# Patient Record
Sex: FEMALE | Race: WHITE | Employment: FULL TIME | URBAN - METROPOLITAN AREA
[De-identification: names, ages, dates, MRNs, and addresses within clinical notes are randomized per-mention and may not be internally consistent; named-entity substitution may affect disease eponyms.]

---

## 2020-03-12 PROBLEM — E66.01 SEVERE OBESITY (HCC): Status: ACTIVE | Noted: 2020-03-12

## 2020-03-13 ENCOUNTER — ANESTHESIA (OUTPATIENT)
Dept: SURGERY | Age: 70
End: 2020-03-13
Payer: COMMERCIAL

## 2020-03-13 ENCOUNTER — ANESTHESIA EVENT (OUTPATIENT)
Dept: SURGERY | Age: 70
End: 2020-03-13
Payer: COMMERCIAL

## 2020-03-13 ENCOUNTER — HOSPITAL ENCOUNTER (OUTPATIENT)
Age: 70
Setting detail: OUTPATIENT SURGERY
Discharge: HOME | End: 2020-03-13
Attending: UROLOGY | Admitting: UROLOGY
Payer: COMMERCIAL

## 2020-03-13 VITALS
BODY MASS INDEX: 38.05 KG/M2 | DIASTOLIC BLOOD PRESSURE: 82 MMHG | HEART RATE: 76 BPM | SYSTOLIC BLOOD PRESSURE: 150 MMHG | OXYGEN SATURATION: 94 % | RESPIRATION RATE: 16 BRPM | HEIGHT: 65 IN | TEMPERATURE: 98 F | WEIGHT: 228.38 LBS

## 2020-03-13 DIAGNOSIS — Z98.890 STATUS POST SURGERY: Primary | ICD-10-CM

## 2020-03-13 LAB
ANION GAP SERPL CALC-SCNC: 7 MMOL/L (ref 7–16)
BUN SERPL-MCNC: 12 MG/DL (ref 8–23)
CALCIUM SERPL-MCNC: 9 MG/DL (ref 8.3–10.4)
CHLORIDE SERPL-SCNC: 108 MMOL/L (ref 98–107)
CO2 SERPL-SCNC: 26 MMOL/L (ref 21–32)
CREAT SERPL-MCNC: 0.88 MG/DL (ref 0.6–1)
ERYTHROCYTE [DISTWIDTH] IN BLOOD BY AUTOMATED COUNT: 13.7 % (ref 11.9–14.6)
GLUCOSE SERPL-MCNC: 101 MG/DL (ref 65–100)
HCT VFR BLD AUTO: 39.5 % (ref 35.8–46.3)
HGB BLD-MCNC: 12.3 G/DL (ref 11.7–15.4)
MCH RBC QN AUTO: 27.9 PG (ref 26.1–32.9)
MCHC RBC AUTO-ENTMCNC: 31.1 G/DL (ref 31.4–35)
MCV RBC AUTO: 89.6 FL (ref 79.6–97.8)
NRBC # BLD: 0 K/UL (ref 0–0.2)
PLATELET # BLD AUTO: 253 K/UL (ref 150–450)
PMV BLD AUTO: 10.6 FL (ref 9.4–12.3)
POTASSIUM BLD-SCNC: 3.7 MMOL/L (ref 3.5–5.1)
POTASSIUM SERPL-SCNC: 3.6 MMOL/L (ref 3.5–5.1)
RBC # BLD AUTO: 4.41 M/UL (ref 4.05–5.2)
SODIUM SERPL-SCNC: 141 MMOL/L (ref 136–145)
WBC # BLD AUTO: 8.5 K/UL (ref 4.3–11.1)

## 2020-03-13 PROCEDURE — 74011000250 HC RX REV CODE- 250: Performed by: UROLOGY

## 2020-03-13 PROCEDURE — 77030040361 HC SLV COMPR DVT MDII -B: Performed by: UROLOGY

## 2020-03-13 PROCEDURE — 77030019927 HC TBNG IRR CYSTO BAXT -A: Performed by: UROLOGY

## 2020-03-13 PROCEDURE — 76210000063 HC OR PH I REC FIRST 0.5 HR: Performed by: UROLOGY

## 2020-03-13 PROCEDURE — 77030037088 HC TUBE ENDOTRACH ORAL NSL COVD-A: Performed by: ANESTHESIOLOGY

## 2020-03-13 PROCEDURE — 74011250636 HC RX REV CODE- 250/636: Performed by: UROLOGY

## 2020-03-13 PROCEDURE — 76010000149 HC OR TIME 1 TO 1.5 HR: Performed by: UROLOGY

## 2020-03-13 PROCEDURE — 80048 BASIC METABOLIC PNL TOTAL CA: CPT

## 2020-03-13 PROCEDURE — 84132 ASSAY OF SERUM POTASSIUM: CPT

## 2020-03-13 PROCEDURE — 74011250636 HC RX REV CODE- 250/636: Performed by: NURSE ANESTHETIST, CERTIFIED REGISTERED

## 2020-03-13 PROCEDURE — 77030018832 HC SOL IRR H20 ICUM -A: Performed by: UROLOGY

## 2020-03-13 PROCEDURE — 74011000250 HC RX REV CODE- 250: Performed by: NURSE ANESTHETIST, CERTIFIED REGISTERED

## 2020-03-13 PROCEDURE — 85027 COMPLETE CBC AUTOMATED: CPT

## 2020-03-13 PROCEDURE — 76060000033 HC ANESTHESIA 1 TO 1.5 HR: Performed by: UROLOGY

## 2020-03-13 PROCEDURE — 76210000020 HC REC RM PH II FIRST 0.5 HR: Performed by: UROLOGY

## 2020-03-13 PROCEDURE — 77030039425 HC BLD LARYNG TRULITE DISP TELE -A: Performed by: ANESTHESIOLOGY

## 2020-03-13 RX ORDER — LIDOCAINE HYDROCHLORIDE 20 MG/ML
INJECTION, SOLUTION EPIDURAL; INFILTRATION; INTRACAUDAL; PERINEURAL AS NEEDED
Status: DISCONTINUED | OUTPATIENT
Start: 2020-03-13 | End: 2020-03-13 | Stop reason: HOSPADM

## 2020-03-13 RX ORDER — MIDAZOLAM HYDROCHLORIDE 1 MG/ML
INJECTION, SOLUTION INTRAMUSCULAR; INTRAVENOUS AS NEEDED
Status: DISCONTINUED | OUTPATIENT
Start: 2020-03-13 | End: 2020-03-13 | Stop reason: HOSPADM

## 2020-03-13 RX ORDER — PROPOFOL 10 MG/ML
INJECTION, EMULSION INTRAVENOUS AS NEEDED
Status: DISCONTINUED | OUTPATIENT
Start: 2020-03-13 | End: 2020-03-13 | Stop reason: HOSPADM

## 2020-03-13 RX ORDER — PHENAZOPYRIDINE HYDROCHLORIDE 200 MG/1
200 TABLET, FILM COATED ORAL
Qty: 12 TAB | Refills: 2 | Status: SHIPPED | OUTPATIENT
Start: 2020-03-13 | End: 2020-03-16

## 2020-03-13 RX ORDER — CEFAZOLIN SODIUM/WATER 2 G/20 ML
2 SYRINGE (ML) INTRAVENOUS
Status: COMPLETED | OUTPATIENT
Start: 2020-03-13 | End: 2020-03-13

## 2020-03-13 RX ORDER — SODIUM CHLORIDE, SODIUM LACTATE, POTASSIUM CHLORIDE, CALCIUM CHLORIDE 600; 310; 30; 20 MG/100ML; MG/100ML; MG/100ML; MG/100ML
25 INJECTION, SOLUTION INTRAVENOUS CONTINUOUS
Status: DISCONTINUED | OUTPATIENT
Start: 2020-03-13 | End: 2020-03-13 | Stop reason: HOSPADM

## 2020-03-13 RX ORDER — ONDANSETRON 2 MG/ML
INJECTION INTRAMUSCULAR; INTRAVENOUS AS NEEDED
Status: DISCONTINUED | OUTPATIENT
Start: 2020-03-13 | End: 2020-03-13 | Stop reason: HOSPADM

## 2020-03-13 RX ORDER — FENTANYL CITRATE 50 UG/ML
INJECTION, SOLUTION INTRAMUSCULAR; INTRAVENOUS AS NEEDED
Status: DISCONTINUED | OUTPATIENT
Start: 2020-03-13 | End: 2020-03-13 | Stop reason: HOSPADM

## 2020-03-13 RX ORDER — ROCURONIUM BROMIDE 10 MG/ML
INJECTION, SOLUTION INTRAVENOUS AS NEEDED
Status: DISCONTINUED | OUTPATIENT
Start: 2020-03-13 | End: 2020-03-13 | Stop reason: HOSPADM

## 2020-03-13 RX ORDER — HYDROCODONE BITARTRATE AND ACETAMINOPHEN 5; 325 MG/1; MG/1
1 TABLET ORAL
Qty: 10 TAB | Refills: 0 | Status: SHIPPED | OUTPATIENT
Start: 2020-03-13 | End: 2020-03-18

## 2020-03-13 RX ORDER — BUPIVACAINE HYDROCHLORIDE 5 MG/ML
INJECTION, SOLUTION EPIDURAL; INTRACAUDAL AS NEEDED
Status: DISCONTINUED | OUTPATIENT
Start: 2020-03-13 | End: 2020-03-13 | Stop reason: HOSPADM

## 2020-03-13 RX ADMIN — ROCURONIUM BROMIDE 40 MG: 10 INJECTION, SOLUTION INTRAVENOUS at 11:12

## 2020-03-13 RX ADMIN — ONDANSETRON 4 MG: 2 INJECTION INTRAMUSCULAR; INTRAVENOUS at 11:56

## 2020-03-13 RX ADMIN — PROPOFOL 200 MG: 10 INJECTION, EMULSION INTRAVENOUS at 11:12

## 2020-03-13 RX ADMIN — SUGAMMADEX 207 MG: 100 INJECTION, SOLUTION INTRAVENOUS at 11:57

## 2020-03-13 RX ADMIN — MIDAZOLAM HYDROCHLORIDE 2 MG: 2 INJECTION, SOLUTION INTRAMUSCULAR; INTRAVENOUS at 11:07

## 2020-03-13 RX ADMIN — FENTANYL CITRATE 50 MCG: 50 INJECTION INTRAMUSCULAR; INTRAVENOUS at 12:02

## 2020-03-13 RX ADMIN — SODIUM CHLORIDE, SODIUM LACTATE, POTASSIUM CHLORIDE, AND CALCIUM CHLORIDE 25 ML/HR: 600; 310; 30; 20 INJECTION, SOLUTION INTRAVENOUS at 10:11

## 2020-03-13 RX ADMIN — LIDOCAINE HYDROCHLORIDE 100 MG: 20 INJECTION, SOLUTION EPIDURAL; INFILTRATION; INTRACAUDAL; PERINEURAL at 11:12

## 2020-03-13 RX ADMIN — Medication 2 G: at 11:05

## 2020-03-13 RX ADMIN — SUGAMMADEX 207 MG: 100 INJECTION, SOLUTION INTRAVENOUS at 12:05

## 2020-03-13 RX ADMIN — FENTANYL CITRATE 50 MCG: 50 INJECTION INTRAMUSCULAR; INTRAVENOUS at 11:12

## 2020-03-13 NOTE — ANESTHESIA PREPROCEDURE EVALUATION
Relevant Problems   No relevant active problems       Anesthetic History               Review of Systems / Medical History  Patient summary reviewed and pertinent labs reviewed    Pulmonary            Asthma        Neuro/Psych   Within defined limits           Cardiovascular                  Exercise tolerance: >4 METS     GI/Hepatic/Renal     GERD: well controlled          Comments: Hx of gastric bypass Endo/Other        Morbid obesity and arthritis     Other Findings            Physical Exam    Airway  Mallampati: II  TM Distance: > 6 cm  Neck ROM: normal range of motion   Mouth opening: Normal     Cardiovascular    Rhythm: regular           Dental  No notable dental hx       Pulmonary                 Abdominal         Other Findings            Anesthetic Plan    ASA: 2  Anesthesia type: general          Induction: Intravenous  Anesthetic plan and risks discussed with: Patient

## 2020-03-13 NOTE — DISCHARGE INSTRUCTIONS
Bladder Augmentation: What to Expect at Home  Your Recovery  Bladder augmentation is surgery to make the bladder larger and improve its ability to stretch. After surgery, your bladder should be able to hold more urine. After surgery, you may feel weak and tired at first. You will probably feel some pain or cramping in your lower belly and need pain medicine for a week or two. Try to avoid heavy lifting and strenuous activities that might put extra pressure on your bladder while you recover. This care sheet gives you a general idea about how long it will take for you to recover. But each person recovers at a different pace. Follow the steps below to get better as quickly as possible. How can you care for yourself at home? Activity  · Rest when you feel tired. Getting enough sleep will help you recover. · Try to walk each day. Start by walking a little more than you did the day before. Bit by bit, increase the amount you walk. Walking boosts blood flow and helps prevent pneumonia and constipation. · Avoid strenuous activities, such as bicycle riding, jogging, weight lifting, or aerobic exercise, for 6 to 8 weeks or until your doctor says it is okay. · For 6 to 8 weeks or until your doctor says it is okay, avoid lifting anything that would make you strain. This may include a child, heavy grocery bags and milk containers, a heavy briefcase or backpack, cat litter or dog food bags, or a vacuum . · Ask your doctor when you can drive again. · You will probably need to take 72 hours off from work. It depends on the type of work you do and how you feel. · You may shower as usual. Pat the cut (incision) dry. Do not take a bath until your doctor tells you it is okay. · Ask your doctor when it is okay for you to have sex. Diet  1. You can eat your normal diet. If your stomach is upset, try bland, low-fat foods like plain rice, broiled chicken, toast, and yogurt.   2. Drink plenty of fluids (unless your doctor tells you not to). 3. You may notice that your bowel movements are not regular right after your surgery. This is common. Try to avoid constipation and straining with bowel movements. You may want to take a fiber supplement every day. If you have not had a bowel movement after a couple of days, ask your doctor about taking a mild laxative. Medicines  1. Your doctor will tell you if and when you can restart your medicines. He or she will also give you instructions about taking any new medicines. 2. If you take blood thinners, such as warfarin (Coumadin), clopidogrel (Plavix), or aspirin, be sure to talk to your doctor. He or she will tell you if and when to start taking those medicines again. Make sure that you understand exactly what your doctor wants you to do. 3. Be safe with medicines. Take pain medicines exactly as directed. 1. If the doctor gave you a prescription medicine for pain, take it as prescribed. 2. If you are not taking a prescription pain medicine, ask your doctor if you can take an over-the-counter medicine. 4. If you think your pain medicine is making you sick to your stomach:  1. Take your medicine after meals (unless your doctor has told you not to). 2. Ask your doctor for a different pain medicine. 5. If your doctor prescribed antibiotics, take them as directed. Do not stop taking them just because you feel better. You need to take the full course of antibiotics. Other instructions  · If your doctor has told you to flush your bladder, follow his or her instructions on how to do this. Follow-up care is a key part of your treatment and safety. Be sure to make and go to all appointments, and call your doctor if you are having problems. It's also a good idea to know your test results and keep a list of the medicines you take. When should you call for help? Call 911 anytime you think you may need emergency care. For example, call if:  · You passed out (lost consciousness).   · You have severe trouble breathing. · You have sudden chest pain and shortness of breath, or you cough up blood. · You have severe pain in your belly. Call your doctor now or seek immediate medical care if:  · You have bright red vaginal bleeding that soaks one or more pads in an hour, or you have large clots. · You are sick to your stomach or cannot keep fluids down. · You have pain that does not get better after you take pain medicine. · You have loose stitches, or your incision comes open. · Your incision is bleeding. · You have vaginal discharge that has increased in amount or smells bad. · Your catheters come out. · Your catheters are not draining urine, even after you flush your bladder. · You have signs of infection, such as:  ¨ Increased pain, swelling, warmth, or redness. ¨ Red streaks leading from the incision. ¨ Pus draining from the incision. ¨ A fever. · You have clots of blood in your urine. · You have trouble passing urine or stool, especially if you have pain or swelling in your lower belly. · You have new or worse pain when you urinate. · You have pain in your back just below your rib cage. This is called flank pain. Watch closely for changes in your health, and be sure to contact your doctor if:  You do not have a bowel movement after taking a laxative. After general anesthesia or intravenous sedation, for 24 hours or while taking prescription Narcotics:  · Limit your activities  · A responsible adult needs to be with you for the next 24 hours  · Do not drive and operate hazardous machinery  · Do not make important personal or business decisions  · Do not drink alcoholic beverages  · If you have not urinated within 8 hours after discharge, please contact your surgeon on call. · If you have sleep apnea and have a CPAP machine, please use it for all naps and sleeping. · Please use caution when taking narcotics and any of your home medications that may cause drowsiness.   ·   *  Please give a list of your current medications to your Primary Care Provider. *  Please update this list whenever your medications are discontinued, doses are      changed, or new medications (including over-the-counter products) are added. *  Please carry medication information at all times in case of emergency situations. These are general instructions for a healthy lifestyle:  No smoking/ No tobacco products/ Avoid exposure to second hand smoke  Surgeon General's Warning:  Quitting smoking now greatly reduces serious risk to your health. Obesity, smoking, and sedentary lifestyle greatly increases your risk for illness  A healthy diet, regular physical exercise & weight monitoring are important for maintaining a healthy lifestyle    You may be retaining fluid if you have a history of heart failure or if you experience any of the following symptoms:  Weight gain of 3 pounds or more overnight or 5 pounds in a week, increased swelling in our hands or feet or shortness of breath while lying flat in bed. Please call your doctor as soon as you notice any of these symptoms; do not wait until your next office visit.

## 2020-03-13 NOTE — ANESTHESIA POSTPROCEDURE EVALUATION
Procedure(s):  CYSTOSCOPY WITH HYDRODISTENTION. general    Anesthesia Post Evaluation      Multimodal analgesia: multimodal analgesia used between 6 hours prior to anesthesia start to PACU discharge  Patient location during evaluation: PACU  Patient participation: complete - patient participated  Level of consciousness: awake  Pain management: adequate  Airway patency: patent  Anesthetic complications: no  Cardiovascular status: acceptable  Respiratory status: acceptable  Hydration status: acceptable  Post anesthesia nausea and vomiting:  none      Vitals Value Taken Time   /70 3/13/2020 12:25 PM   Temp 36.6 °C (97.9 °F) 3/13/2020 12:14 PM   Pulse 83 3/13/2020 12:26 PM   Resp 16 3/13/2020 12:20 PM   SpO2 94 % 3/13/2020 12:26 PM   Vitals shown include unvalidated device data.

## 2020-03-13 NOTE — BRIEF OP NOTE
BRIEF OPERATIVE NOTE    Date of Procedure: 3/13/2020   Preoperative Diagnosis: Urinary frequency [R35.0]  Postoperative Diagnosis: Urinary frequency [R35.0]    Procedure(s):  CYSTOSCOPY WITH HYDRODISTENTION  Surgeon(s) and Role:     Gerald Ludwig MD - Primary         Surgical Staff:  Circ-1: Abisai Rojo RN  Event Time In   Incision Start 11:31 AM   Incision Close 11:47 AM     Anesthesia: General   Estimated Blood Loss: Minimal  Specimens: None  Findings: See dictated note   Complications: None

## 2020-03-18 NOTE — OP NOTES
[facility]  OPERATIVE REPORT    Name:  Dominga Davis  MR#:  [id number]  :  1950  ACCOUNT #:  [de-identified]  DATE OF SERVICE:  2020    PREOPERATIVE DIAGNOSIS:  Possible interstitial cystitis. POSTOPERATIVE DIAGNOSIS:  Probable interstitial cystitis. PROCEDURE PERFORMED:  Cystoscopy with hydrodistention. SURGEON:  Pia Douglas MD    ANESTHESIA:  General.    COMPLICATIONS:  None. SPECIMENS REMOVED:  None. ESTIMATED BLOOD LOSS:  Minimal.    DRAINS:  None. NARRATIVE:  The patient was taken to the OR and after adequate general anesthesia was achieved, she was placed in dorsal lithotomy position and prepped and draped in the usual sterile fashion for a cystoscopy case. A preliminary cystourethroscopy was performed with a 22-Danish cystoscope. This revealed a normal urethra. Once within the bladder it was noted that there were no mucosal lesions. Both ureteral orifices were of normal size, shape and position. There was no trabeculation or cellule formation. With the bag of fluid approximately 30 inches above the pubic symphysis, hydrodistention of the bladder was performed for 12 minutes. Bladder capacity was round 1100 mL. Upon drainage, there was widespread submucosal hemorrhage, which is suggestive of interstitial cystitis. 30 mL of 0.5% Marcaine were then instilled. All instruments were removed. The patient was taken down out of dorsal lithotomy position, awakened, extubated and taken from the OR without any further incident or complaint.       Adan Coley MD    [id number]  D:  2020 21:18  T:  2020 2:10  JOB #:  [id number]

## 2021-11-01 PROBLEM — R00.0 TACHYCARDIA: Status: ACTIVE | Noted: 2021-11-01

## 2021-11-01 PROBLEM — I49.3 PVC'S (PREMATURE VENTRICULAR CONTRACTIONS): Status: ACTIVE | Noted: 2021-11-01

## 2021-11-01 PROBLEM — I10 PRIMARY HYPERTENSION: Status: ACTIVE | Noted: 2021-11-01

## 2021-11-25 PROBLEM — I50.22 CHRONIC SYSTOLIC CONGESTIVE HEART FAILURE (HCC): Status: ACTIVE | Noted: 2021-11-25

## 2021-12-23 NOTE — PROGRESS NOTES
Pre-procedure call completed. Instructed to arrive at 0730 for [facility]. Instructed to take ASA 81 mg x 4 before arrival while HOLDING all vitamins, supplements and  Spirolactone. Instructed to take all other am prescribed medications. Instructed to remain NPO after MN.

## 2021-12-27 ENCOUNTER — HOSPITAL ENCOUNTER (OUTPATIENT)
Age: 71
Setting detail: OUTPATIENT SURGERY
Discharge: HOME | End: 2021-12-27
Attending: INTERNAL MEDICINE | Admitting: INTERNAL MEDICINE
Payer: COMMERCIAL

## 2021-12-27 VITALS
BODY MASS INDEX: 39.27 KG/M2 | WEIGHT: 230 LBS | OXYGEN SATURATION: 96 % | SYSTOLIC BLOOD PRESSURE: 142 MMHG | HEART RATE: 71 BPM | HEIGHT: 64 IN | RESPIRATION RATE: 18 BRPM | DIASTOLIC BLOOD PRESSURE: 75 MMHG

## 2021-12-27 DIAGNOSIS — I50.22 CHRONIC SYSTOLIC CONGESTIVE HEART FAILURE (HCC): ICD-10-CM

## 2021-12-27 DIAGNOSIS — I49.3 PVC'S (PREMATURE VENTRICULAR CONTRACTIONS): ICD-10-CM

## 2021-12-27 LAB
ALBUMIN SERPL-MCNC: 3.8 G/DL (ref 3.2–4.6)
ALBUMIN/GLOB SERPL: 1 {RATIO} (ref 1.2–3.5)
ALP SERPL-CCNC: 79 U/L (ref 50–136)
ALT SERPL-CCNC: 35 U/L (ref 12–65)
ANION GAP SERPL CALC-SCNC: 6 MMOL/L (ref 7–16)
AST SERPL-CCNC: 26 U/L (ref 15–37)
ATRIAL RATE: 73 BPM
BILIRUB SERPL-MCNC: 0.4 MG/DL (ref 0.2–1.1)
BUN SERPL-MCNC: 15 MG/DL (ref 8–23)
CALCIUM SERPL-MCNC: 9.3 MG/DL (ref 8.3–10.4)
CALCULATED P AXIS, ECG09: 35 DEGREES
CALCULATED R AXIS, ECG10: 13 DEGREES
CALCULATED T AXIS, ECG11: -9 DEGREES
CHLORIDE SERPL-SCNC: 113 MMOL/L (ref 98–107)
CO2 SERPL-SCNC: 25 MMOL/L (ref 21–32)
CREAT SERPL-MCNC: 0.94 MG/DL (ref 0.6–1)
DIAGNOSIS, 93000: NORMAL
ERYTHROCYTE [DISTWIDTH] IN BLOOD BY AUTOMATED COUNT: 13.5 % (ref 11.9–14.6)
GLOBULIN SER CALC-MCNC: 3.8 G/DL (ref 2.3–3.5)
GLUCOSE SERPL-MCNC: 112 MG/DL (ref 65–100)
HCT VFR BLD AUTO: 37.8 % (ref 35.8–46.3)
HGB BLD-MCNC: 11.9 G/DL (ref 11.7–15.4)
MCH RBC QN AUTO: 27.8 PG (ref 26.1–32.9)
MCHC RBC AUTO-ENTMCNC: 31.5 G/DL (ref 31.4–35)
MCV RBC AUTO: 88.3 FL (ref 79.6–97.8)
NRBC # BLD: 0 K/UL (ref 0–0.2)
P-R INTERVAL, ECG05: 170 MS
PLATELET # BLD AUTO: 250 K/UL (ref 150–450)
PMV BLD AUTO: 11 FL (ref 9.4–12.3)
POTASSIUM SERPL-SCNC: 3.3 MMOL/L (ref 3.5–5.1)
PROT SERPL-MCNC: 7.6 G/DL (ref 6.3–8.2)
Q-T INTERVAL, ECG07: 386 MS
QRS DURATION, ECG06: 96 MS
QTC CALCULATION (BEZET), ECG08: 425 MS
RBC # BLD AUTO: 4.28 M/UL (ref 4.05–5.2)
SODIUM SERPL-SCNC: 144 MMOL/L (ref 136–145)
VENTRICULAR RATE, ECG03: 73 BPM
WBC # BLD AUTO: 8.6 K/UL (ref 4.3–11.1)

## 2021-12-27 PROCEDURE — 93458 L HRT ARTERY/VENTRICLE ANGIO: CPT | Performed by: INTERNAL MEDICINE

## 2021-12-27 PROCEDURE — C1894 INTRO/SHEATH, NON-LASER: HCPCS | Performed by: INTERNAL MEDICINE

## 2021-12-27 PROCEDURE — 74011000250 HC RX REV CODE- 250: Performed by: INTERNAL MEDICINE

## 2021-12-27 PROCEDURE — 77030042317 HC BND COMPR HEMSTAT -B: Performed by: INTERNAL MEDICINE

## 2021-12-27 PROCEDURE — 99152 MOD SED SAME PHYS/QHP 5/>YRS: CPT | Performed by: INTERNAL MEDICINE

## 2021-12-27 PROCEDURE — 93005 ELECTROCARDIOGRAM TRACING: CPT | Performed by: INTERNAL MEDICINE

## 2021-12-27 PROCEDURE — 74011250636 HC RX REV CODE- 250/636: Performed by: INTERNAL MEDICINE

## 2021-12-27 PROCEDURE — 80053 COMPREHEN METABOLIC PANEL: CPT

## 2021-12-27 PROCEDURE — 77030015766: Performed by: INTERNAL MEDICINE

## 2021-12-27 PROCEDURE — C1769 GUIDE WIRE: HCPCS | Performed by: INTERNAL MEDICINE

## 2021-12-27 PROCEDURE — 74011000636 HC RX REV CODE- 636: Performed by: INTERNAL MEDICINE

## 2021-12-27 PROCEDURE — 77030016699 HC CATH ANGI DX INFN1 CARD -A: Performed by: INTERNAL MEDICINE

## 2021-12-27 PROCEDURE — 85027 COMPLETE CBC AUTOMATED: CPT

## 2021-12-27 RX ORDER — ACETAMINOPHEN 325 MG/1
650 TABLET ORAL
Status: CANCELLED | OUTPATIENT
Start: 2021-12-27

## 2021-12-27 RX ORDER — SODIUM CHLORIDE 0.9 % (FLUSH) 0.9 %
5-40 SYRINGE (ML) INJECTION EVERY 8 HOURS
Status: CANCELLED | OUTPATIENT
Start: 2021-12-27

## 2021-12-27 RX ORDER — SODIUM CHLORIDE 9 MG/ML
75 INJECTION, SOLUTION INTRAVENOUS CONTINUOUS
Status: DISCONTINUED | OUTPATIENT
Start: 2021-12-27 | End: 2021-12-27 | Stop reason: HOSPADM

## 2021-12-27 RX ORDER — HEPARIN SODIUM 200 [USP'U]/100ML
INJECTION, SOLUTION INTRAVENOUS
Status: COMPLETED | OUTPATIENT
Start: 2021-12-27 | End: 2021-12-27

## 2021-12-27 RX ORDER — LIDOCAINE HYDROCHLORIDE 10 MG/ML
INJECTION INFILTRATION; PERINEURAL AS NEEDED
Status: DISCONTINUED | OUTPATIENT
Start: 2021-12-27 | End: 2021-12-27 | Stop reason: HOSPADM

## 2021-12-27 RX ORDER — FENTANYL CITRATE 50 UG/ML
INJECTION, SOLUTION INTRAMUSCULAR; INTRAVENOUS AS NEEDED
Status: DISCONTINUED | OUTPATIENT
Start: 2021-12-27 | End: 2021-12-27 | Stop reason: HOSPADM

## 2021-12-27 RX ORDER — SODIUM CHLORIDE 9 MG/ML
75 INJECTION, SOLUTION INTRAVENOUS CONTINUOUS
Status: CANCELLED | OUTPATIENT
Start: 2021-12-27

## 2021-12-27 RX ORDER — HYDROCODONE BITARTRATE AND ACETAMINOPHEN 5; 325 MG/1; MG/1
1 TABLET ORAL
Status: CANCELLED | OUTPATIENT
Start: 2021-12-27

## 2021-12-27 RX ORDER — ONDANSETRON 2 MG/ML
4 INJECTION INTRAMUSCULAR; INTRAVENOUS
Status: CANCELLED | OUTPATIENT
Start: 2021-12-27 | End: 2021-12-28

## 2021-12-27 RX ORDER — SODIUM CHLORIDE 0.9 % (FLUSH) 0.9 %
5-40 SYRINGE (ML) INJECTION AS NEEDED
Status: CANCELLED | OUTPATIENT
Start: 2021-12-27

## 2021-12-27 RX ORDER — MIDAZOLAM HYDROCHLORIDE 1 MG/ML
INJECTION, SOLUTION INTRAMUSCULAR; INTRAVENOUS AS NEEDED
Status: DISCONTINUED | OUTPATIENT
Start: 2021-12-27 | End: 2021-12-27 | Stop reason: HOSPADM

## 2021-12-27 RX ORDER — GUAIFENESIN 100 MG/5ML
324 LIQUID (ML) ORAL
Status: DISCONTINUED | OUTPATIENT
Start: 2021-12-27 | End: 2021-12-27 | Stop reason: HOSPADM

## 2021-12-27 NOTE — Clinical Note
TRANSFER - OUT REPORT:     Verbal report given to: cpru rn. Report consisted of patient's Situation, Background, Assessment and   Recommendations(SBAR). Opportunity for questions and clarification was provided. Patient transported to: recovery.

## 2021-12-27 NOTE — DISCHARGE INSTRUCTIONS
HEART CATHETERIZATION/ANGIOGRAPHY DISCHARGE INSTRUCTIONS    1. Check puncture site frequently for swelling or bleeding. If there is any bleeding, apply pressure over the area with a clean towel or washcloth and call 911. Notify your doctor for any redness, swelling, drainage, or oozing from the puncture site. Notify your doctor for any fever or chills. 2. If the extremity becomes cold, numb, or painful call  [facility] cardiology at [phone].  3. Activity should be limited for the next 48 hours. No heavy lifting, pushing, pulling  or strenuous activity for 48 hours. No heavy lifting (anything over 10 pounds) for 3 days. 4. You may resume your usual diet. Drink more fluids than usual.  5. Have a responsible person drive you home and stay with you for at least 24 hours after your heart catheterization/angiography. 6. You may remove bandage from your Right and Groin in 24 hours. You may shower in 24 hours. No tub baths, hot tubs, or swimming for 1 week. Do not place any lotions, creams, powders, or ointments over puncture site for 1 week. You may place a clean band-aid over the puncture site each day for 5 days. Change daily. Sedation for a Medical Procedure: Care Instructions     You were given a sedative medication during your visit. While many of the effects will have worn   off before you leave; you may continue to feel some effects for several hours. Common side effects from sedation include:  · Feeling sleepy. (Your doctors and nurses will make sure you are not too sleepy to go home.)  · Nausea and vomiting. This usually does not last long. · Feeling tired. How can you care for yourself at home? Activity    · Don't do anything for 24 hours that requires attention to detail. It takes time for the medicine effects to completely wear off. · Do not make important legal decisions for 24 hours. · Do not sign any legal documents for 24 hours.      · Do not drink alcohol today     · For your safety, you should not drive or operate heavy machinery for the remainder of the day     · Rest when you feel tired. Getting enough sleep will help you recover. Diet    · You can eat your normal diet, unless your doctor gives you other instructions. If your stomach is upset, try clear liquids and bland, low-fat foods like plain toast or rice. · Drink plenty of fluids (unless your doctor tells you not to). · Don't drink alcohol for 24 hours. Medicines    · Be safe with medicines. Read and follow all instructions on the label. · If the doctor gave you a prescription medicine for pain, take it as prescribed. · If you are not taking a prescription pain medicine, ask your doctor if you can take an over-the-counter medicine. · If you think your pain medicine is making you sick to your stomach:  · Take your medicine after meals (unless your doctor has told you not to). · Ask your doctor for a different pain medicine. I have read the above instructions and have had the opportunity to ask questions.       Patient: ________________________   Date: _____________    Witness: _______________________   Date: _____________

## 2021-12-27 NOTE — PROGRESS NOTES
Patient received to [address] [place] [address] room # 15  Ambulatory from [facility]. Patient scheduled for [facility] today with Dr Watt Lesches. Procedure reviewed & questions answered, voiced good understanding consent obtained & placed on chart. All medications and medical history reviewed. Will prep patient per orders. Patient & family updated on plan of care. The patient has a fraility score of 3-MANAGING WELL, based on age and ability to perform ADLs.

## 2021-12-27 NOTE — PROGRESS NOTES
TRANSFER - OUT REPORT:    Verbal report given to Alejandro Regan RN(name) on [employer]  being transferred to CPRU(unit) for routine progression of care       Report consisted of patients Situation, Background, Assessment and   Recommendations(SBAR). Information from the following report(s) SBAR was reviewed with the receiving nurse.     [facility] with Dr Dick Mantilla  No interventions  R Radial  TR band at 12mL  Versed 3mg  Fentanyl 50mcg  Heparin 5000 units in radial cocktail

## 2021-12-27 NOTE — PROGRESS NOTES
R band deflation completed. right radial dressed with gauze and tegaderm using sterile technique. No bleeding or hematoma.  Tolerated without difficulty

## 2021-12-27 NOTE — PROGRESS NOTES
Report received from [address] [place] [address]. Procedural finding communicated. Intra procedural medication administration reviewed. Progression of care discussed. Patient received into CPRU room 3, Post C    Access site without bleeding or swelling. Right wrist    Patient instructed to limit movement of right upper extremity. Routine post procedural vital signs & site assessment initiated. [No Ischemia] : no Ischemia [___] : [unfilled] [LVEF ___%] : LVEF [unfilled]%

## 2022-03-18 PROBLEM — R00.0 TACHYCARDIA: Status: ACTIVE | Noted: 2021-11-01

## 2022-03-19 PROBLEM — I50.22 CHRONIC SYSTOLIC CONGESTIVE HEART FAILURE (HCC): Status: ACTIVE | Noted: 2021-11-25

## 2022-03-19 PROBLEM — I10 PRIMARY HYPERTENSION: Status: ACTIVE | Noted: 2021-11-01

## 2022-03-19 PROBLEM — I49.3 PVC'S (PREMATURE VENTRICULAR CONTRACTIONS): Status: ACTIVE | Noted: 2021-11-01

## 2022-03-19 PROBLEM — E66.01 SEVERE OBESITY (HCC): Status: ACTIVE | Noted: 2020-03-12

## 2022-06-23 ENCOUNTER — OFFICE VISIT (OUTPATIENT)
Dept: CARDIOLOGY CLINIC | Age: 72
End: 2022-06-23
Payer: COMMERCIAL

## 2022-06-23 VITALS
HEIGHT: 64 IN | HEART RATE: 71 BPM | DIASTOLIC BLOOD PRESSURE: 76 MMHG | WEIGHT: 236 LBS | SYSTOLIC BLOOD PRESSURE: 126 MMHG | BODY MASS INDEX: 40.29 KG/M2

## 2022-06-23 DIAGNOSIS — I50.22 CHRONIC SYSTOLIC CONGESTIVE HEART FAILURE (HCC): Primary | ICD-10-CM

## 2022-06-23 DIAGNOSIS — I10 PRIMARY HYPERTENSION: ICD-10-CM

## 2022-06-23 DIAGNOSIS — I49.3 PVC'S (PREMATURE VENTRICULAR CONTRACTIONS): ICD-10-CM

## 2022-06-23 PROCEDURE — 1123F ACP DISCUSS/DSCN MKR DOCD: CPT | Performed by: INTERNAL MEDICINE

## 2022-06-23 PROCEDURE — 99214 OFFICE O/P EST MOD 30 MIN: CPT | Performed by: INTERNAL MEDICINE

## 2022-06-23 RX ORDER — METOPROLOL SUCCINATE 100 MG/1
100 TABLET, EXTENDED RELEASE ORAL DAILY
Qty: 30 TABLET | Refills: 5 | Status: SHIPPED | OUTPATIENT
Start: 2022-06-23

## 2022-06-23 RX ORDER — POTASSIUM CHLORIDE 750 MG/1
10 TABLET, FILM COATED, EXTENDED RELEASE ORAL DAILY
Qty: 90 TABLET | Refills: 3 | Status: SHIPPED | OUTPATIENT
Start: 2022-06-23

## 2022-06-23 ASSESSMENT — ENCOUNTER SYMPTOMS: SHORTNESS OF BREATH: 0

## 2022-07-24 DIAGNOSIS — I50.22 CHRONIC SYSTOLIC (CONGESTIVE) HEART FAILURE (HCC): ICD-10-CM

## 2022-07-25 RX ORDER — LANOLIN ALCOHOL/MO/W.PET/CERES
CREAM (GRAM) TOPICAL
Qty: 60 TABLET | Refills: 3 | Status: SHIPPED | OUTPATIENT
Start: 2022-07-25

## 2022-09-20 RX ORDER — SPIRONOLACTONE 25 MG/1
25 TABLET ORAL DAILY
Qty: 90 TABLET | Refills: 3 | Status: SHIPPED | OUTPATIENT
Start: 2022-09-20

## 2022-09-20 RX ORDER — MAGNESIUM OXIDE 400 MG/1
400 TABLET ORAL 2 TIMES DAILY
Qty: 180 TABLET | Refills: 3 | Status: SHIPPED | OUTPATIENT
Start: 2022-09-20

## 2022-09-20 NOTE — TELEPHONE ENCOUNTER
MEDICATION REFILL REQUEST      Name of Medication:  Spironolactone   Dose:  25 mg  Frequency:  daily   Quantity:    Days' supply:  90      Pharmacy Name/Location:  did not leave    MEDICATION REFILL REQUEST      Name of Medication:  Magnesium   Dose:  400 mg  Frequency:  twice a day   Quantity:    Days' supply:  90      Pharmacy Name/Location:  did not leave

## 2022-12-27 PROBLEM — I50.22 CHRONIC SYSTOLIC CONGESTIVE HEART FAILURE (HCC): Status: ACTIVE | Noted: 2021-11-25

## 2023-01-09 ENCOUNTER — TELEPHONE (OUTPATIENT)
Dept: CARDIOLOGY CLINIC | Age: 73
End: 2023-01-09

## 2023-01-09 NOTE — TELEPHONE ENCOUNTER
Patient called and informed that per last office note, she was to have an echo which was done 12-22-22 in [facility], labs were done with PCP in December. Do not see anything else that she would need. Patient voiced understanding and thanked me//hunter      ----- Message -----  From: Tomás Wills  Sent: 1/9/2023   9:06 AM EST  To: [facility] Clinical Staff  Subject: Tests/lab work                                   Do I need to do any lab work before the next appointment? One of the messages says something about an order being valid. through March. I'm just confused.     Uma Iqbal

## 2023-01-19 ENCOUNTER — OFFICE VISIT (OUTPATIENT)
Dept: CARDIOLOGY CLINIC | Age: 73
End: 2023-01-19
Payer: COMMERCIAL

## 2023-01-19 VITALS
SYSTOLIC BLOOD PRESSURE: 130 MMHG | WEIGHT: 231 LBS | HEIGHT: 64 IN | DIASTOLIC BLOOD PRESSURE: 79 MMHG | BODY MASS INDEX: 39.44 KG/M2 | HEART RATE: 80 BPM

## 2023-01-19 DIAGNOSIS — E66.01 SEVERE OBESITY (HCC): ICD-10-CM

## 2023-01-19 DIAGNOSIS — I50.22 CHRONIC SYSTOLIC CONGESTIVE HEART FAILURE (HCC): ICD-10-CM

## 2023-01-19 DIAGNOSIS — M54.9 BACK PAIN, UNSPECIFIED BACK LOCATION, UNSPECIFIED BACK PAIN LATERALITY, UNSPECIFIED CHRONICITY: ICD-10-CM

## 2023-01-19 DIAGNOSIS — J45.909 ASTHMA, UNSPECIFIED ASTHMA SEVERITY, UNSPECIFIED WHETHER COMPLICATED, UNSPECIFIED WHETHER PERSISTENT: ICD-10-CM

## 2023-01-19 DIAGNOSIS — I10 PRIMARY HYPERTENSION: Primary | ICD-10-CM

## 2023-01-19 PROCEDURE — 99214 OFFICE O/P EST MOD 30 MIN: CPT | Performed by: INTERNAL MEDICINE

## 2023-01-19 PROCEDURE — 3075F SYST BP GE 130 - 139MM HG: CPT | Performed by: INTERNAL MEDICINE

## 2023-01-19 PROCEDURE — 93000 ELECTROCARDIOGRAM COMPLETE: CPT | Performed by: INTERNAL MEDICINE

## 2023-01-19 PROCEDURE — 1123F ACP DISCUSS/DSCN MKR DOCD: CPT | Performed by: INTERNAL MEDICINE

## 2023-01-19 PROCEDURE — 3078F DIAST BP <80 MM HG: CPT | Performed by: INTERNAL MEDICINE

## 2023-01-19 RX ORDER — LOSARTAN POTASSIUM 50 MG/1
50 TABLET ORAL DAILY
Qty: 90 TABLET | Refills: 3 | Status: SHIPPED | OUTPATIENT
Start: 2023-01-19

## 2023-01-19 RX ORDER — SPIRONOLACTONE 25 MG/1
25 TABLET ORAL DAILY
Qty: 90 TABLET | Refills: 3 | Status: SHIPPED | OUTPATIENT
Start: 2023-01-19

## 2023-01-19 RX ORDER — METOPROLOL SUCCINATE 100 MG/1
100 TABLET, EXTENDED RELEASE ORAL DAILY
Qty: 90 TABLET | Refills: 3 | Status: SHIPPED | OUTPATIENT
Start: 2023-01-19

## 2023-01-19 RX ORDER — POTASSIUM CHLORIDE 750 MG/1
10 TABLET, FILM COATED, EXTENDED RELEASE ORAL DAILY
Qty: 90 TABLET | Refills: 3 | Status: SHIPPED | OUTPATIENT
Start: 2023-01-19

## 2023-01-19 ASSESSMENT — ENCOUNTER SYMPTOMS
SPUTUM PRODUCTION: 1
SHORTNESS OF BREATH: 0
WHEEZING: 1
BACK PAIN: 1

## 2023-01-19 NOTE — PROGRESS NOTES
[address] [place], [place]  [address], [facility], [address] [place] [address]  PHONE: [phone]    Caro Simmons  1950      SUBJECTIVE:   Caro Simmons is a 67 y.o. female seen for a follow up visit regarding the following:     Chief Complaint   Patient presents with    Congestive Heart Failure       HPI:    Patient presents for follow-up. Multiple issues addressed. Chronic systolic heart failure: Recent echo showed low normal LV systolic function as outlined below:   Echo (12/22/2022): EF 50-55%. Mild LVH. Normal diastolic function. Mild MR/TR. RVSP 26. Ascending aorta 3.6 cm    Hypertension: Blood pressures currently well controlled. Obesity: weight is down 7 pounds since    URI:  Had URI around thanksgiving. Irritated asthma. Using inhaler and mucinex. Has wheezing. Still with symptoms. Has not seen pulmonology in past.      Right sided back pain. This primarily occurs in the morning and some during the day. She is concerned due to aldactone. Past Medical History, Past Surgical History, Family history, Social History, and Medications were all reviewed with the patient today and updated as necessary.            Current Outpatient Medications:     metoprolol succinate (TOPROL XL) 100 MG extended release tablet, Take 1 tablet by mouth daily, Disp: 90 tablet, Rfl: 3    spironolactone (ALDACTONE) 25 MG tablet, Take 1 tablet by mouth daily, Disp: 90 tablet, Rfl: 3    losartan (COZAAR) 50 MG tablet, Take 1 tablet by mouth daily, Disp: 90 tablet, Rfl: 3    potassium chloride (KLOR-CON) 10 MEQ extended release tablet, Take 1 tablet by mouth daily, Disp: 90 tablet, Rfl: 3    magnesium oxide (MAG-OX) 400 MG tablet, Take 1 tablet by mouth 2 times daily, Disp: 180 tablet, Rfl: 3    magnesium oxide (MAG-OX) 400 (240 Mg) MG tablet, TAKE 1 (ONE) TABLET BY MOUTH TWICE DAILY, Disp: 60 tablet, Rfl: 3    omeprazole (PRILOSEC) 40 MG delayed release capsule, Take 40 mg by mouth daily, Disp: , Rfl:      No Known Allergies     Patient Active Problem List    Diagnosis Date Noted    Chronic systolic congestive heart failure ([facility] [address] 11/25/2021     Priority: Low     1.  Echo (11/5/2021): EF 40-45%. Mild LAE. Moderate MR. Mild TR.  RVSP   27  2. LHC (12/27/21):  EF 45-50%. Normal coronary arteries. 3.  Echo (12/22/2022): EF 50-55%. Mild LVH. Normal diastolic function. Mild MR/TR. RVSP 26. Ascending aorta 3.6 cm        Tachycardia 11/01/2021     Priority: Low    PVC's (premature ventricular contractions) 11/01/2021     Priority: Low     Holter (2/21): Average heart rate 75. Minimum heart rate 58. Max heart   rate 98.  3464 PVCs. 15 PACs with 1 3 beat run. No atrial fibrillation. No pauses. Total of 226,870 QRS complexes        Primary hypertension 11/01/2021     Priority: Low    Severe obesity ([facility] [address] 03/12/2020     Priority: Low        Social History     Tobacco Use    Smoking status: Never    Smokeless tobacco: Never   Substance Use Topics    Alcohol use: Never       ROS:    Review of Systems   Constitutional: Negative for malaise/fatigue. Cardiovascular:  Negative for chest pain. Respiratory:  Positive for sputum production and wheezing. Negative for shortness of breath. Musculoskeletal:  Positive for back pain. Negative for arthritis. Neurological:  Negative for focal weakness. Psychiatric/Behavioral:  Negative for depression. PHYSICAL EXAM:  Wt Readings from Last 3 Encounters:   01/19/23 231 lb (104.8 kg)   12/22/22 238 lb (108 kg)   06/23/22 236 lb (107 kg)     BP Readings from Last 3 Encounters:   01/19/23 130/79   12/22/22 126/68   06/23/22 126/76     Pulse Readings from Last 3 Encounters:   01/19/23 80   12/22/22 67   06/23/22 71       Physical Exam  Constitutional:       General: She is not in acute distress. Appearance: Normal appearance. Neck:      Vascular: No carotid bruit. Cardiovascular:      Rate and Rhythm: Normal rate and regular rhythm.    Pulmonary:      Breath sounds: Wheezing present. Abdominal:      General: There is no distension. Palpations: Abdomen is soft. Musculoskeletal:         General: No swelling. Skin:     General: Skin is warm and dry. Neurological:      General: No focal deficit present. Psychiatric:         Mood and Affect: Mood normal.       Medical problems and test results were reviewed with the patient today. Lab Results   Component Value Date    WBC 8.6 12/27/2021    HGB 11.9 12/27/2021    HCT 37.8 12/27/2021    MCV 88.3 12/27/2021     12/27/2021       Lab Results   Component Value Date/Time     04/15/2022 09:52 AM    K 4.2 04/15/2022 09:52 AM     04/15/2022 09:52 AM    CO2 21 04/15/2022 09:52 AM    BUN 11 04/15/2022 09:52 AM    CREATININE 1.02 04/15/2022 09:52 AM    GLUCOSE 91 04/15/2022 09:52 AM    CALCIUM 9.2 04/15/2022 09:52 AM        No results found for: CHOL  No results found for: TRIG  No results found for: HDL  No results found for: LDLCHOLESTEROL, LDLCALC  No results found for: LABVLDL, VLDL  No results found for: CHOLHDLRATIO     Data from outside records/labs from outside providers have been reviewed and summarized as noted in the HPI, past history and data review sections of this note       ASSESSMENT and PLAN      1. Primary hypertension  Currently BP appears to be under acceptable control on current therapy. Continue current medications including Toprol and Losartan. .   Discussed monitoring ambulatory BP readings to ensure continued optimal management. If BP becomes elevated, patient is encouraged to contact my office for further titration of therapy. - EKG 12 lead  - metoprolol succinate (TOPROL XL) 100 MG extended release tablet; Take 1 tablet by mouth daily  Dispense: 90 tablet; Refill: 3  - spironolactone (ALDACTONE) 25 MG tablet; Take 1 tablet by mouth daily  Dispense: 90 tablet; Refill: 3  - losartan (COZAAR) 50 MG tablet; Take 1 tablet by mouth daily  Dispense: 90 tablet;  Refill: 3  - potassium chloride (KLOR-CON) 10 MEQ extended release tablet; Take 1 tablet by mouth daily  Dispense: 90 tablet; Refill: 3    2. Chronic systolic congestive heart failure (HCC)  Currently appears well compensated. Discussed the reassuring results of recent echocardiogram with now low normal LV function. Continue CHF regimen. - metoprolol succinate (TOPROL XL) 100 MG extended release tablet; Take 1 tablet by mouth daily  Dispense: 90 tablet; Refill: 3  - potassium chloride (KLOR-CON) 10 MEQ extended release tablet; Take 1 tablet by mouth daily  Dispense: 90 tablet; Refill: 3    3. Severe obesity ([address]  Continued efforts of weight loss. 4. Asthma, unspecified asthma severity, unspecified whether complicated, unspecified whether persistent  Refer to pulmonary  - Perlita Sosa MD, Pulmonology, [place]    5. Back pain, unspecified back location, unspecified back pain laterality, unspecified chronicity  Discussed that it seems unlikely Aldactone would be causing her back pain but okay to hold for 2 weeks to see if her symptoms improve. If no improvement she is instructed to resume this medication. If substantial improvement she will contact my office for further instructions. This time, could consider eplerenone             Return in about 6 months (around 7/19/2023). Keara Nichols MD  1/19/2023  11:47 AM    This note may have been dictated using speech recognition software.   As a result, error of speech recognition may have occurred

## 2023-02-10 ENCOUNTER — OFFICE VISIT (OUTPATIENT)
Dept: UROLOGY | Age: 73
End: 2023-02-10

## 2023-02-10 DIAGNOSIS — R35.0 URINARY FREQUENCY: ICD-10-CM

## 2023-02-10 DIAGNOSIS — N39.46 MIXED STRESS AND URGE URINARY INCONTINENCE: Primary | ICD-10-CM

## 2023-02-10 LAB
BILIRUBIN, URINE, POC: NEGATIVE
BLOOD URINE, POC: NEGATIVE
GLUCOSE URINE, POC: NEGATIVE
KETONES, URINE, POC: NEGATIVE
LEUKOCYTE ESTERASE, URINE, POC: NEGATIVE
NITRITE, URINE, POC: NEGATIVE
PH, URINE, POC: 6 (ref 4.6–8)
PROTEIN,URINE, POC: NORMAL
SPECIFIC GRAVITY, URINE, POC: 1.02 (ref 1–1.03)
URINALYSIS CLARITY, POC: NORMAL
URINALYSIS COLOR, POC: NORMAL
UROBILINOGEN, POC: NORMAL

## 2023-02-10 ASSESSMENT — ENCOUNTER SYMPTOMS
NAUSEA: 0
BACK PAIN: 0

## 2023-02-10 NOTE — PROGRESS NOTES
[facility]  [address] [address] [address]  [phone]          Jennifer Jeong  : 1950    Chief Complaint   Patient presents with    Incontinence          HPI     Jennifer Jeong is a 67 y.o. female  Returns today for follow-up on urinary leakage. Its been almost 3 years since her last visit. We had seen her in the past due to pelvic pain bladder pain and frequent urination. She underwent a cystoscopy hydrodistention which actually showed petechial hemorrhaging throughout about 30% of her bladder. She tells me however that she is not having any issues with pain. Urinary frequency can be as often as once every 30 minutes during the day. She does have occasional urge leakage. She is wearing 3-4 moderate size poise pads a day. She also has leakage with coughing and sneezing. She had 3 pregnancies with 3 C-sections. She underwent a hysterectomy back in . That was due to the endometriosis. She said at the time of that procedure she had more endometriosis on the left side and there has always been more pulling and tugging in that area. Her urine today is clear.         Past Medical History:   Diagnosis Date    Arthritis     Asthma     GERD (gastroesophageal reflux disease)      Past Surgical History:   Procedure Laterality Date     SECTION      x3    HYSTERECTOMY (CERVIX STATUS UNKNOWN)      UROLOGICAL SURGERY      bladder     Current Outpatient Medications   Medication Sig Dispense Refill    metoprolol succinate (TOPROL XL) 100 MG extended release tablet Take 1 tablet by mouth daily 90 tablet 3    spironolactone (ALDACTONE) 25 MG tablet Take 1 tablet by mouth daily 90 tablet 3    losartan (COZAAR) 50 MG tablet Take 1 tablet by mouth daily 90 tablet 3    potassium chloride (KLOR-CON) 10 MEQ extended release tablet Take 1 tablet by mouth daily 90 tablet 3    magnesium oxide (MAG-OX) 400 MG tablet Take 1 tablet by mouth 2 times daily 180 tablet 3    magnesium oxide (MAG-OX) 400 (240 Mg) MG tablet TAKE 1 (ONE) TABLET BY MOUTH TWICE DAILY 60 tablet 3    omeprazole (PRILOSEC) 40 MG delayed release capsule Take 40 mg by mouth daily       No current facility-administered medications for this visit. No Known Allergies  Social History     Socioeconomic History    Marital status:      Spouse name: Not on file    Number of children: Not on file    Years of education: Not on file    Highest education level: Not on file   Occupational History    Not on file   Tobacco Use    Smoking status: Never    Smokeless tobacco: Never   Substance and Sexual Activity    Alcohol use: Never    Drug use: Not on file    Sexual activity: Not on file   Other Topics Concern    Not on file   Social History Narrative    Not on file     Social Determinants of Health     Financial Resource Strain: Not on file   Food Insecurity: Not on file   Transportation Needs: Not on file   Physical Activity: Not on file   Stress: Not on file   Social Connections: Not on file   Intimate Partner Violence: Not on file   Housing Stability: Not on file     Family History   Problem Relation Age of Onset    Muscular Dystrophy Brother     Muscular Dystrophy Brother     Muscular Dystrophy Father     Diabetes Father     Heart Disease Father        Review of Systems  Constitutional:   Negative for fever. GI:  Negative for nausea. Musculoskeletal:  Negative for back pain.     Urinalysis  UA - Dipstick  Results for orders placed or performed in visit on 02/10/23   AMB POC URINALYSIS DIP STICK AUTO W/O MICRO   Result Value Ref Range    Color (UA POC)      Clarity (UA POC)      Glucose, Urine, POC Negative Negative    Bilirubin, Urine, POC Negative Negative    KETONES, Urine, POC Negative Negative    Specific Gravity, Urine, POC 1.025 1.001 - 1.035    Blood (UA POC) Negative Negative    pH, Urine, POC 6.0 4.6 - 8.0    Protein, Urine, POC Trace Negative    Urobilinogen, POC 0.2 mg/dL     Nitrite, Urine, POC Negative Negative    Leukocyte Esterase, Urine, POC Negative Negative         PHYSICAL EXAM    GENERAL: No acute distress, Awake, Alert, Oriented X 3, Gait normal  ABDOMEN: soft,LARGE and obese  SKIN: No rash, no erythema, no lacerations or abrasions, no ecchymosis  MUSCULOSKELETAL - MAEW, no edema       Assessment and Plan    ICD-10-CM    1. Mixed stress and urge urinary incontinence  N39.46 AMB POC URINALYSIS DIP STICK AUTO W/O MICRO      2. Urinary frequency  R35.0         Different types of incontinence were discussed in great detail. Patient does have mixed type. We will treat conservative at this time and try Myrbetriq 50 mg p.o. daily. I hope this will give her more control and decrease her frequency. I will see her back in 6 weeks. I explained to her to not get frustrated if her symptoms have not improved as there are other means of therapy that we can try. Symptoms of OAB were discussed in detail. I encouraged patient to try to increase her water intake. I have instructed pt there are certain foods and beverages that will cause her symptoms to be worse. These include caffeine, alcohol and potentially spicy foods. Treatment options for OAB were also discussed. These include anticholinergic therapy, PTNS, biofeedback, Botox and, InterStim. NIYA Sawyer - NP  Dr. Tierney Blunt is supervising physician today and he approves plan of care. Return in about 6 weeks (around 3/24/2023) for for recheck. Elements of this note have been dictated using speech recognition software. Although reviewed, errors of speech recognition may have occurred.

## 2023-02-22 DIAGNOSIS — J45.909 ASTHMA, UNSPECIFIED ASTHMA SEVERITY, UNSPECIFIED WHETHER COMPLICATED, UNSPECIFIED WHETHER PERSISTENT: Primary | ICD-10-CM

## 2023-03-03 NOTE — PROGRESS NOTES
Patient Name:  Brian Brar                               YOB: 1950  MRN: [id number]                                                Office Visit 3/8/2023    ASSESSMENT AND PLAN:  (Medical Decision Making)    1. Shortness of breath  Spirometry shows mild restriction which was likely due to poor quality test/exhalation effort. There really is not much sign of active airflow obstruction. Given her history of asthma, short trial of inhaled steroids makes sense. - Spirometry Without Bronchodilator    2. Moderate persistent asthma, unspecified whether complicated  We will treat with Singulair, Breo Ellipta 100, as needed albuterol and follow-up with a formal PFT. - montelukast (SINGULAIR) 10 MG tablet; Take 1 tablet by mouth daily  Dispense: 30 tablet; Refill: 3    3. Allergic rhinitis, unspecified seasonality, unspecified trigger  We will use Flonase Astelin Singulair Zyrtec to try to control significant postnasal drip and allergic rhinitis  - fluticasone furoate-vilanterol (BREO ELLIPTA) 100-25 MCG/ACT inhaler; Inhale 1 puff into the lungs daily  Dispense: 1 each; Refill: 5  - fluticasone (FLONASE) 50 MCG/ACT nasal spray; 1 spray by Each Nostril route daily  Dispense: 32 g; Refill: 1  - azelastine (ASTELIN) 0.1 % nasal spray; 1 spray by Nasal route 2 times daily Use in each nostril as directed  Dispense: 60 mL; Refill: 1  - montelukast (SINGULAIR) 10 MG tablet; Take 1 tablet by mouth daily  Dispense: 30 tablet; Refill: 3  - cetirizine (ZYRTEC) 10 MG tablet; Take 1 tablet by mouth daily  Dispense: 30 tablet; Refill: 0    4. Chronic cough  Difficult to say if this is due to asthma or postnasal drip. Plans to treat both are above. - fluticasone furoate-vilanterol (BREO ELLIPTA) 100-25 MCG/ACT inhaler; Inhale 1 puff into the lungs daily  Dispense: 1 each;  Refill: 5  Orders Placed This Encounter   Medications    fluticasone furoate-vilanterol (BREO ELLIPTA) 100-25 MCG/ACT inhaler     Sig: Inhale 1 puff into the lungs daily     Dispense:  1 each     Refill:  5    fluticasone (FLONASE) 50 MCG/ACT nasal spray     Si spray by Each Nostril route daily     Dispense:  32 g     Refill:  1    azelastine (ASTELIN) 0.1 % nasal spray     Si spray by Nasal route 2 times daily Use in each nostril as directed     Dispense:  60 mL     Refill:  1    montelukast (SINGULAIR) 10 MG tablet     Sig: Take 1 tablet by mouth daily     Dispense:  30 tablet     Refill:  3    cetirizine (ZYRTEC) 10 MG tablet     Sig: Take 1 tablet by mouth daily     Dispense:  30 tablet     Refill:  0       No orders of the defined types were placed in this encounter. Follow-up and Dispositions    Return in about 3 months (around 2023) for With me or Dorothy Givens. Monika Gregory MD    Total time for encounter on day of encounter was 45 minutes. This time includes chart prep, review of tests/procedures, review of other provider's notes, documentation and counseling patient regarding disease process and medications. ___________________________________________________________________         ______      REASON FOR VISIT:   Chief Complaint   Patient presents with    New Patient    Asthma       HISTORY OF PRESENT ILLNESS:    Ms. Vella Ganser is a 67 y.o. female with a PMH of never smoking, coronary artery disease, hypertension, asthma, arthritis who is seen at [facility] today for evaluation of asthma after referral by Dr. Annetta Young. She has shortness of breath climbing 18 steps which has been going on about 4 months ago after she had a respiratory infection around . Albuterol doesn't help much. She has had a cough as well. In November, had lots of mucus and congestion in sinuses and chest.  Levaquin helped some- she took 2 rounds of this and steroids. Coughing has improved some after codeine cough suppressant. IT is most bothersome at night and in the morning. She takes omeprazole daily.     She has allergies and currently they seem a little bit flared up. She is having more rhinitis and nasal congestion. 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 She has lived in [place] in [place]. Muscular dystrophy runs in her family (brothers  as well as father). REVIEW OF SYSTEMS:   10 point review of systems is negative except as reported in HPI. PHYSICAL EXAM:   Vitals:    23 1250   BP: 130/78   Pulse: 82   Resp: 15   Temp: 96.9 °F (36.1 °C)   SpO2: 93%   Weight: 230 lb 4.8 oz (104.5 kg)   Height: 5' 3\" (1.6 m)     Body mass index is 40.8 kg/m². General:   Alert, cooperative, no distress, appears stated age. Eyes/Ears/Nose:   Conjunctivae/corneas clear. PERRL. Nasal mucosa is normal.  Normal TMs and external auditory canals. Mouth/Throat:  Lips, mucosa, and tongue normal. Teeth and gums normal.        Lungs:   Wheezes in right base     Heart:   Regular rate and rhythm, S1, S2 normal, no murmur, click, rub or gallop. Abdomen:    Soft, non-tender. Extremities:  Extremities normal, atraumatic, no cyanosis or edema. Skin:  Skin color normal. No rashes or lesions     Neurologic:  A&Ox3     DIAGNOSTIC TESTS:                                                                                                                        Pulmonary Function Testing:   Date           FVC 2.05 (73%)        FEV1 1.65 (78%)        FEV1/FVC 0.80        FEF 25-75%         TLC         FRC         RV         DLCO           Office Spirometry Results Latest Ref Rng & Units 3/8/2023   FVC L 2.05   FEV1 L 1.65   FEV1 %PRED-PRE % 78   FVC %PRED-PRE % 73   FEV1/FVC % 80       No results found for this or any previous visit. No results found for this or any previous visit.     LABS:   Lab Results   Component Value Date/Time    WBC 8.6 12/27/2021 07:38 AM    HGB 11.9 12/27/2021 07:38 AM    HCT 37.8 12/27/2021 07:38 AM     12/27/2021 07:38 AM    TSH 2.020 11/02/2021 09:27 AM     Imaging: I performed an independent interpretation of the patient's images. CXR: XR CHEST STANDARD TWO VW 03/08/2023    Narrative  PA AND LATERAL CHEST X-RAY. Clinical Indication: Asthma    Comparison: No prior    Findings: 2 views of the chest submitted demonstrate the cardiac silhouette and  mediastinum to be unremarkable. There is no pleural effusion or pneumothorax. The lung parenchyma is clear. The included osseous structures are unremarkable. Impression  Normal chest x-ray. CT Chest: No results found for this or any previous visit from the past 3650 days. Nuclear Medicine: No results found for this or any previous visit from the past 3650 days. 12/22/22    TRANSTHORACIC ECHOCARDIOGRAM (TTE) COMPLETE (CONTRAST/BUBBLE/3D PRN) 12/22/2022  3:56 PM, 12/22/2022 12:00 AM (Final)    Interpretation Summary    Left Ventricle: Low normal left ventricular systolic function with a visually estimated EF of 50 - 55%. Left ventricle size is normal. Mildly increased wall thickness. Normal wall motion. Normal diastolic function. Right Ventricle: Right ventricle size is normal. Normal systolic function. Mitral Valve: Mildly thickened leaflet, at the anterior leaflet. Mild regurgitation. Aorta: Normal sized sinus of Valsalva. Mildly dilated ascending aorta. Ao ascending diameter is 3.6 cm. Technical qualifiers: Technically difficult study due to patient's body habitus, color flow Doppler was performed and pulse wave and/or continuous wave Doppler was performed.     Signed by: Margret Saucedo DO on 12/22/2022  3:56 PM, Signed by: Unknown Provider Result on 12/22/2022 12:00 AM       REFERENCE INFO:                                                                                                                            Past Medical History:   Diagnosis Date    Arthritis     Asthma     GERD (gastroesophageal reflux disease)      Allergies   Allergen Reactions    Amoxicillin Other (See Comments)     Current Outpatient Medications   Medication Instructions    azelastine (ASTELIN) 0.1 % nasal spray 1 spray, Nasal, 2 TIMES DAILY, Use in each nostril as directed    cetirizine (ZYRTEC) 10 mg, Oral, DAILY    fluticasone (FLONASE) 50 MCG/ACT nasal spray 1 spray, Each Nostril, DAILY    fluticasone furoate-vilanterol (BREO ELLIPTA) 100-25 MCG/ACT inhaler 1 puff, Inhalation, DAILY    losartan (COZAAR) 50 mg, Oral, DAILY    magnesium oxide (MAG-OX) 400 (240 Mg) MG tablet TAKE 1 (ONE) TABLET BY MOUTH TWICE DAILY    magnesium oxide (MAG-OX) 400 mg, Oral, 2 TIMES DAILY    metoprolol succinate (TOPROL XL) 100 mg, Oral, DAILY    mirabegron (MYRBETRIQ) 25 mg, Oral, DAILY    montelukast (SINGULAIR) 10 mg, Oral, DAILY    omeprazole (PRILOSEC) 40 mg, Oral, DAILY    potassium chloride (KLOR-CON) 10 MEQ extended release tablet 10 mEq, Oral, DAILY    spironolactone (ALDACTONE) 25 mg, Oral, DAILY

## 2023-03-08 ENCOUNTER — HOSPITAL ENCOUNTER (OUTPATIENT)
Dept: GENERAL RADIOLOGY | Age: 73
Discharge: HOME | End: 2023-03-11
Payer: COMMERCIAL

## 2023-03-08 ENCOUNTER — OFFICE VISIT (OUTPATIENT)
Dept: PULMONOLOGY | Age: 73
End: 2023-03-08
Payer: COMMERCIAL

## 2023-03-08 VITALS
HEIGHT: 63 IN | SYSTOLIC BLOOD PRESSURE: 130 MMHG | DIASTOLIC BLOOD PRESSURE: 78 MMHG | BODY MASS INDEX: 40.8 KG/M2 | TEMPERATURE: 96.9 F | RESPIRATION RATE: 15 BRPM | HEART RATE: 82 BPM | WEIGHT: 230.3 LBS | OXYGEN SATURATION: 93 %

## 2023-03-08 DIAGNOSIS — J45.909 ASTHMA, UNSPECIFIED ASTHMA SEVERITY, UNSPECIFIED WHETHER COMPLICATED, UNSPECIFIED WHETHER PERSISTENT: ICD-10-CM

## 2023-03-08 DIAGNOSIS — J30.9 ALLERGIC RHINITIS, UNSPECIFIED SEASONALITY, UNSPECIFIED TRIGGER: ICD-10-CM

## 2023-03-08 DIAGNOSIS — R05.3 CHRONIC COUGH: ICD-10-CM

## 2023-03-08 DIAGNOSIS — J45.40 MODERATE PERSISTENT ASTHMA, UNSPECIFIED WHETHER COMPLICATED: ICD-10-CM

## 2023-03-08 DIAGNOSIS — R06.02 SHORTNESS OF BREATH: Primary | ICD-10-CM

## 2023-03-08 LAB
EXPIRATORY TIME: NORMAL
FEF 25-75% %PRED-PRE: NORMAL
FEF 25-75% PRED: NORMAL
FEF 25-75%-PRE: NORMAL
FEV1 %PRED-PRE: 78 %
FEV1 PRED: NORMAL
FEV1/FVC %PRED-PRE: NORMAL
FEV1/FVC PRED: NORMAL
FEV1/FVC: 80 %
FEV1: 1.65 L
FVC %PRED-PRE: 73 %
FVC PRED: NORMAL
FVC: 2.05 L
PEF %PRED-PRE: NORMAL
PEF PRED: NORMAL
PEF-PRE: NORMAL

## 2023-03-08 PROCEDURE — 3078F DIAST BP <80 MM HG: CPT | Performed by: INTERNAL MEDICINE

## 2023-03-08 PROCEDURE — 3075F SYST BP GE 130 - 139MM HG: CPT | Performed by: INTERNAL MEDICINE

## 2023-03-08 PROCEDURE — 1123F ACP DISCUSS/DSCN MKR DOCD: CPT | Performed by: INTERNAL MEDICINE

## 2023-03-08 PROCEDURE — 94010 BREATHING CAPACITY TEST: CPT | Performed by: INTERNAL MEDICINE

## 2023-03-08 PROCEDURE — 99204 OFFICE O/P NEW MOD 45 MIN: CPT | Performed by: INTERNAL MEDICINE

## 2023-03-08 PROCEDURE — 71046 X-RAY EXAM CHEST 2 VIEWS: CPT

## 2023-03-08 RX ORDER — FLUTICASONE PROPIONATE 50 MCG
1 SPRAY, SUSPENSION (ML) NASAL DAILY
Qty: 32 G | Refills: 1 | Status: SHIPPED | OUTPATIENT
Start: 2023-03-08

## 2023-03-08 RX ORDER — MONTELUKAST SODIUM 10 MG/1
10 TABLET ORAL DAILY
Qty: 30 TABLET | Refills: 3 | Status: SHIPPED | OUTPATIENT
Start: 2023-03-08

## 2023-03-08 RX ORDER — FLUTICASONE FUROATE AND VILANTEROL 100; 25 UG/1; UG/1
1 POWDER RESPIRATORY (INHALATION) DAILY
Qty: 1 EACH | Refills: 5 | Status: SHIPPED | OUTPATIENT
Start: 2023-03-08

## 2023-03-08 RX ORDER — AZELASTINE 1 MG/ML
1 SPRAY, METERED NASAL 2 TIMES DAILY
Qty: 60 ML | Refills: 1 | Status: SHIPPED | OUTPATIENT
Start: 2023-03-08

## 2023-03-08 RX ORDER — CETIRIZINE HYDROCHLORIDE 10 MG/1
10 TABLET ORAL DAILY
Qty: 30 TABLET | Refills: 0 | Status: SHIPPED | OUTPATIENT
Start: 2023-03-08

## 2023-03-08 ASSESSMENT — PULMONARY FUNCTION TESTS
FEV1/FVC: 80
FVC: 2.05
FEV1: 1.65
FVC_PERCENT_PREDICTED_PRE: 73
FEV1_PERCENT_PREDICTED_PRE: 78

## 2023-03-08 NOTE — PATIENT INSTRUCTIONS
For asthma:  Plan to start Breo Ellipta inhaler 1 puff daily. Rinse mouth after use. Use albuterol inhaler every 4-6 hours as needed for coughing or shortness of breath  Singulair 10 mg every night is an asthma controller medicine as well that has been prescribed  We will check a formal lung function tests in 3 months. For sinus congestion-we are doing a trial of therapy for 2 weeks to see if we can solve this problem. Zyrtec 10 mg daily-an antihistamine  Flonase 1 spray twice daily-a nasal steroid  Astelin 1 spray each side twice a day-add nasal antihistamine  Singulair 10 mg every night  If no improvement within 2 weeks stop all the treatments for rhinitis.

## 2023-03-31 ENCOUNTER — TELEPHONE (OUTPATIENT)
Dept: UROLOGY | Age: 73
End: 2023-03-31

## 2023-03-31 ENCOUNTER — OFFICE VISIT (OUTPATIENT)
Dept: UROLOGY | Age: 73
End: 2023-03-31

## 2023-03-31 DIAGNOSIS — R35.0 URINARY FREQUENCY: ICD-10-CM

## 2023-03-31 DIAGNOSIS — R05.3 CHRONIC COUGH: ICD-10-CM

## 2023-03-31 DIAGNOSIS — N39.46 MIXED STRESS AND URGE URINARY INCONTINENCE: Primary | ICD-10-CM

## 2023-03-31 LAB
BILIRUBIN, URINE, POC: NEGATIVE
BLOOD URINE, POC: NEGATIVE
GLUCOSE URINE, POC: NEGATIVE
KETONES, URINE, POC: NEGATIVE
LEUKOCYTE ESTERASE, URINE, POC: NEGATIVE
NITRITE, URINE, POC: NEGATIVE
PH, URINE, POC: 5.5 (ref 4.6–8)
PROTEIN,URINE, POC: NEGATIVE
SPECIFIC GRAVITY, URINE, POC: 1.02 (ref 1–1.03)
URINALYSIS CLARITY, POC: NORMAL
URINALYSIS COLOR, POC: NORMAL
UROBILINOGEN, POC: NORMAL

## 2023-03-31 RX ORDER — METHYLPREDNISOLONE 4 MG/1
TABLET ORAL
Qty: 1 KIT | Refills: 0 | Status: SHIPPED | OUTPATIENT
Start: 2023-03-31 | End: 2023-04-06

## 2023-03-31 RX ORDER — LEVOFLOXACIN 500 MG/1
500 TABLET, FILM COATED ORAL DAILY
Qty: 10 TABLET | Refills: 0 | Status: SHIPPED | OUTPATIENT
Start: 2023-03-31

## 2023-03-31 ASSESSMENT — ENCOUNTER SYMPTOMS
NAUSEA: 0
BACK PAIN: 0

## 2023-03-31 NOTE — TELEPHONE ENCOUNTER
Referral to [facility] has been placed. She needs urodynamic testing.     Thank you,   Ashley Neff

## 2023-03-31 NOTE — PROGRESS NOTES
medication we will proceed with urodynamic testing with [facility]. Cough-  I will send in Medrol Dosepak and Levaquin 500 mg. I have asked her to see her pulmonary if her symptoms do not improve. She verbalizes understanding to call them for follow up. NIYA Salazar - GEE Cunningham is supervising physician today and he approves plan of care. Return for after testing. Elements of this note have been dictated using speech recognition software. Although reviewed, errors of speech recognition may have occurred.

## 2023-04-17 ENCOUNTER — TELEPHONE (OUTPATIENT)
Dept: CARDIOLOGY CLINIC | Age: 73
End: 2023-04-17

## 2023-04-17 NOTE — TELEPHONE ENCOUNTER
Patient called and informed that prescription for losartan was called into [facility] 1[facility] for a year. Patient states that she has not called them, just looked at her bottle. She will contact pharmacy[facility]  ----- Message -----  From: Senia Brar  Sent: 4/15/2023   1:00 PM EDT  To: [facility] Clinical Staff  Subject: Losartan                                         I took the last tablet this morning. I meant to call yesterday and got busy and forgot. If you want me to continue taking the losartan I will need a new updated prescription. Thank you.

## 2023-06-20 NOTE — PROGRESS NOTES
right base     Heart:   Regular rate and rhythm, S1, S2 normal, no murmur, click, rub or gallop. Abdomen:    Soft, non-tender. Extremities:  Extremities normal, atraumatic, no cyanosis or edema. Skin:  Skin color normal. No rashes or lesions     Neurologic:  A&Ox3     DIAGNOSTIC TESTS:                                                                                                                        Pulmonary Function Testing:   Date   3/8/23 6/15/23       FVC 2.05 (73%) 2.51 (93%)       FEV1 1.65 (78%) 1.93 (94%)       FEV1/FVC 0.80 0.77       FEF 25-75%         TLC  4.97 (101%)       FRC  2.48 (89%)       RV  2.29 (105%)       DLCO  17.3 (78%)         LABS:   Lab Results   Component Value Date/Time    WBC 8.6 12/27/2021 07:38 AM    HGB 11.9 12/27/2021 07:38 AM    HCT 37.8 12/27/2021 07:38 AM     12/27/2021 07:38 AM    TSH 2.020 11/02/2021 09:27 AM     Imaging: I performed an independent interpretation of the patient's images. CXR: XR CHEST STANDARD TWO VW 03/08/2023    Narrative  PA AND LATERAL CHEST X-RAY. Clinical Indication: Asthma    Comparison: No prior    Findings: 2 views of the chest submitted demonstrate the cardiac silhouette and  mediastinum to be unremarkable. There is no pleural effusion or pneumothorax. The lung parenchyma is clear. The included osseous structures are unremarkable. Impression  Normal chest x-ray. CT Chest: No results found for this or any previous visit from the past 3650 days. Nuclear Medicine: No results found for this or any previous visit from the past 3650 days. 12/22/22    TRANSTHORACIC ECHOCARDIOGRAM (TTE) COMPLETE (CONTRAST/BUBBLE/3D PRN) 12/22/2022  3:56 PM, 12/22/2022 12:00 AM (Final)    Interpretation Summary    Left Ventricle: Low normal left ventricular systolic function with a visually estimated EF of 50 - 55%. Left ventricle size is normal. Mildly increased wall thickness. Normal wall motion. Normal diastolic function.

## 2023-06-23 ENCOUNTER — OFFICE VISIT (OUTPATIENT)
Dept: PULMONOLOGY | Age: 73
End: 2023-06-23
Payer: COMMERCIAL

## 2023-06-23 VITALS
DIASTOLIC BLOOD PRESSURE: 66 MMHG | OXYGEN SATURATION: 95 % | SYSTOLIC BLOOD PRESSURE: 118 MMHG | HEIGHT: 65 IN | TEMPERATURE: 97.1 F | RESPIRATION RATE: 15 BRPM | HEART RATE: 81 BPM | BODY MASS INDEX: 38.65 KG/M2 | WEIGHT: 232 LBS

## 2023-06-23 DIAGNOSIS — J30.9 ALLERGIC RHINITIS, UNSPECIFIED SEASONALITY, UNSPECIFIED TRIGGER: ICD-10-CM

## 2023-06-23 DIAGNOSIS — R06.02 SHORTNESS OF BREATH: ICD-10-CM

## 2023-06-23 DIAGNOSIS — R05.3 CHRONIC COUGH: ICD-10-CM

## 2023-06-23 DIAGNOSIS — J45.40 MODERATE PERSISTENT ASTHMA, UNSPECIFIED WHETHER COMPLICATED: Primary | ICD-10-CM

## 2023-06-23 PROCEDURE — 3078F DIAST BP <80 MM HG: CPT | Performed by: INTERNAL MEDICINE

## 2023-06-23 PROCEDURE — 3074F SYST BP LT 130 MM HG: CPT | Performed by: INTERNAL MEDICINE

## 2023-06-23 PROCEDURE — 99214 OFFICE O/P EST MOD 30 MIN: CPT | Performed by: INTERNAL MEDICINE

## 2023-06-23 PROCEDURE — 1123F ACP DISCUSS/DSCN MKR DOCD: CPT | Performed by: INTERNAL MEDICINE

## 2023-06-23 RX ORDER — ALBUTEROL SULFATE 90 UG/1
AEROSOL, METERED RESPIRATORY (INHALATION)
COMMUNITY
Start: 2023-04-10 | End: 2023-06-23 | Stop reason: SDUPTHER

## 2023-06-23 RX ORDER — FLUTICASONE FUROATE AND VILANTEROL TRIFENATATE 100; 25 UG/1; UG/1
1 POWDER RESPIRATORY (INHALATION) DAILY
Qty: 1 EACH | Refills: 11 | Status: SHIPPED | OUTPATIENT
Start: 2023-06-23

## 2023-06-23 RX ORDER — ALBUTEROL SULFATE 90 UG/1
AEROSOL, METERED RESPIRATORY (INHALATION)
Qty: 1 EACH | Refills: 11 | Status: SHIPPED | OUTPATIENT
Start: 2023-06-23

## 2023-06-23 RX ORDER — MONTELUKAST SODIUM 10 MG/1
10 TABLET ORAL DAILY
Qty: 90 TABLET | Refills: 3 | Status: SHIPPED | OUTPATIENT
Start: 2023-06-23

## 2023-06-23 RX ORDER — CETIRIZINE HYDROCHLORIDE 10 MG/1
10 TABLET ORAL DAILY
Qty: 30 TABLET | Refills: 3 | Status: SHIPPED | OUTPATIENT
Start: 2023-06-23

## 2023-06-23 RX ORDER — ESTRADIOL 0.1 MG/G
CREAM VAGINAL
COMMUNITY
Start: 2023-04-19

## 2023-07-11 ASSESSMENT — ENCOUNTER SYMPTOMS: SHORTNESS OF BREATH: 0

## 2023-07-11 NOTE — PROGRESS NOTES
[address], [facility], [address] [place] [address]  PHONE: [phone]    Alyssa Galarza  1950      SUBJECTIVE:   Alyssa Galarza is a 67 y.o. female seen for a follow up visit regarding the following:     Chief Complaint   Patient presents with    Congestive Heart Failure     6 month follow up    Hypertension       HPI:    Patient presents for follow-up. Last visit was in January of this year. Multiple issues were addressed at today's visit. Chronic systolic heart failure: Compliant with medical regimen. Last echo with low normal LV systolic function. Hypertension:  BP controlled. Asthma: At her last visit she was referred to [place] [facility]. Seen by Dr. Mckayla Velasquez. She has been treated with Breo Ellipta, Zyrtec, Flonase, Astelin, Singulair and as needed albuterol. Overall cough and dyspnea have improved    Obesity:  Unchanged    Edema: Notes intermittent edema. Typically improves in the morning but this morning had persistent edema. Needs as needed diuretic    Past Medical History, Past Surgical History, Family history, Social History, and Medications were all reviewed with the patient today and updated as necessary. Current Outpatient Medications:     furosemide (LASIX) 40 MG tablet, Take one tablet daily as needed for fluid, Disp: 60 tablet, Rfl: 3    estradiol (ESTRACE) 0.1 MG/GM vaginal cream, INSERT 1 gram vaginally NIGHTLY AT BEDTIME FOR 2 WEEKS, THEN 1 gram 2x/week AT bedtime]. , Disp: , Rfl:     BREO ELLIPTA 100-25 MCG/ACT inhaler, Inhale 1 puff into the lungs daily, Disp: 1 each, Rfl: 11    albuterol sulfate HFA (PROVENTIL;VENTOLIN;PROAIR) 108 (90 Base) MCG/ACT inhaler, Inhale 2 puffs every 6 (six) hours as needed for wheezing or shortness of breath., Disp: 1 each, Rfl: 11    montelukast (SINGULAIR) 10 MG tablet, Take 1 tablet by mouth daily, Disp: 90 tablet, Rfl: 3    cetirizine (ZYRTEC) 10 MG tablet, Take 1 tablet by mouth daily, Disp: 30 tablet, Rfl:

## 2023-07-12 ENCOUNTER — OFFICE VISIT (OUTPATIENT)
Age: 73
End: 2023-07-12
Payer: COMMERCIAL

## 2023-07-12 VITALS
WEIGHT: 235.4 LBS | HEIGHT: 65 IN | DIASTOLIC BLOOD PRESSURE: 72 MMHG | HEART RATE: 79 BPM | SYSTOLIC BLOOD PRESSURE: 118 MMHG | BODY MASS INDEX: 39.22 KG/M2

## 2023-07-12 DIAGNOSIS — I50.22 CHRONIC SYSTOLIC CONGESTIVE HEART FAILURE (HCC): Primary | ICD-10-CM

## 2023-07-12 DIAGNOSIS — E66.01 SEVERE OBESITY (HCC): ICD-10-CM

## 2023-07-12 DIAGNOSIS — I10 PRIMARY HYPERTENSION: ICD-10-CM

## 2023-07-12 DIAGNOSIS — R60.0 LOCALIZED EDEMA: ICD-10-CM

## 2023-07-12 PROCEDURE — 3074F SYST BP LT 130 MM HG: CPT | Performed by: INTERNAL MEDICINE

## 2023-07-12 PROCEDURE — 1123F ACP DISCUSS/DSCN MKR DOCD: CPT | Performed by: INTERNAL MEDICINE

## 2023-07-12 PROCEDURE — 99214 OFFICE O/P EST MOD 30 MIN: CPT | Performed by: INTERNAL MEDICINE

## 2023-07-12 PROCEDURE — 3078F DIAST BP <80 MM HG: CPT | Performed by: INTERNAL MEDICINE

## 2023-07-12 RX ORDER — FUROSEMIDE 40 MG/1
TABLET ORAL
Qty: 60 TABLET | Refills: 3 | Status: SHIPPED | OUTPATIENT
Start: 2023-07-12

## 2023-07-28 DIAGNOSIS — I50.22 CHRONIC SYSTOLIC CONGESTIVE HEART FAILURE (HCC): ICD-10-CM

## 2023-07-28 DIAGNOSIS — R60.0 LOCALIZED EDEMA: ICD-10-CM

## 2023-07-28 LAB
ANION GAP SERPL CALC-SCNC: 8 MMOL/L (ref 2–11)
BUN SERPL-MCNC: 14 MG/DL (ref 8–23)
CALCIUM SERPL-MCNC: 9.2 MG/DL (ref 8.3–10.4)
CHLORIDE SERPL-SCNC: 110 MMOL/L (ref 101–110)
CO2 SERPL-SCNC: 25 MMOL/L (ref 21–32)
CREAT SERPL-MCNC: 0.9 MG/DL (ref 0.6–1)
GLUCOSE SERPL-MCNC: 100 MG/DL (ref 65–100)
POTASSIUM SERPL-SCNC: 4 MMOL/L (ref 3.5–5.1)
SODIUM SERPL-SCNC: 143 MMOL/L (ref 133–143)

## 2023-07-31 ENCOUNTER — TELEPHONE (OUTPATIENT)
Age: 73
End: 2023-07-31

## 2023-07-31 NOTE — TELEPHONE ENCOUNTER
Patient called with results, voiced understanding and thanked me//hunter    ----- Message from Teresa Rincon MD sent at 7/31/2023  3:40 PM EDT -----  Please call patient. Labs are reviewed and are acceptable. Continue current medications. Call with any questions, concerns or new/worsening cardiac symptoms.

## 2023-08-11 ENCOUNTER — PREP FOR PROCEDURE (OUTPATIENT)
Dept: UROLOGY | Age: 73
End: 2023-08-11

## 2023-08-11 ENCOUNTER — OFFICE VISIT (OUTPATIENT)
Dept: UROLOGY | Age: 73
End: 2023-08-11
Payer: COMMERCIAL

## 2023-08-11 ENCOUNTER — TELEPHONE (OUTPATIENT)
Dept: UROLOGY | Age: 73
End: 2023-08-11

## 2023-08-11 DIAGNOSIS — N39.3 SUI (STRESS URINARY INCONTINENCE, FEMALE): Primary | ICD-10-CM

## 2023-08-11 DIAGNOSIS — N39.46 MIXED STRESS AND URGE URINARY INCONTINENCE: Primary | ICD-10-CM

## 2023-08-11 LAB
BILIRUBIN, URINE, POC: NEGATIVE
BLOOD URINE, POC: NEGATIVE
GLUCOSE URINE, POC: NEGATIVE
KETONES, URINE, POC: NEGATIVE
LEUKOCYTE ESTERASE, URINE, POC: NORMAL
NITRITE, URINE, POC: NEGATIVE
PH, URINE, POC: 5.5 (ref 4.6–8)
PROTEIN,URINE, POC: NEGATIVE
SPECIFIC GRAVITY, URINE, POC: 1.02 (ref 1–1.03)
URINALYSIS CLARITY, POC: NORMAL
URINALYSIS COLOR, POC: NORMAL
UROBILINOGEN, POC: NORMAL

## 2023-08-11 PROCEDURE — 81003 URINALYSIS AUTO W/O SCOPE: CPT | Performed by: UROLOGY

## 2023-08-11 PROCEDURE — 1123F ACP DISCUSS/DSCN MKR DOCD: CPT | Performed by: UROLOGY

## 2023-08-11 PROCEDURE — 99214 OFFICE O/P EST MOD 30 MIN: CPT | Performed by: UROLOGY

## 2023-08-11 ASSESSMENT — ENCOUNTER SYMPTOMS
BACK PAIN: 0
NAUSEA: 0

## 2023-08-11 NOTE — PROGRESS NOTES
[facility]  [facility]  [address] [facility]  [place], [address]  [facility]  [phone]    Celsa Martinez  : 1950    Chief Complaint   Patient presents with    Follow-up        HPI     Celsa Martinez is a 67 y.o. female Back today for follow-up on urinary incontinence. She was given Myrbetriq 50 mg and took it for 6 weeks. She says she saw no difference in her incontinence. She still wears pads during the day. He can be up at night voiding. Continues to have issues with bronchitis and coughing. She has been through several rounds of steroids with that and antibiotics. Initial history as below  Returns today for follow-up on urinary leakage. Its been almost 3 years since her last visit. We had seen her in the past due to pelvic pain bladder pain and frequent urination. She underwent a cystoscopy hydrodistention which actually showed petechial hemorrhaging throughout about 30% of her bladder. She tells me however that she is not having any issues with pain. Urinary frequency can be as often as once every 30 minutes during the day. She does have occasional urge leakage. She is wearing 3-4 moderate size poise pads a day. She also has leakage with coughing and sneezing. She had 3 pregnancies with 3 C-sections. She underwent a hysterectomy back in . That was due to the endometriosis. She said at the time of that procedure she had more endometriosis on the left side and there has always been more pulling and tugging in that area. Her incontinence has improved but still leaks withy coughing and sneezing . CMG showed TOSHIA and DI. Currently on Estrace cream. No enuresis, has nocturia x 2-3. Vinod Cabrales wears 2 pads/day. Occasional suprapubic pain. No hematuria.      Past Medical History:   Diagnosis Date    Arthritis     Asthma     GERD (gastroesophageal reflux disease)      Past Surgical History:   Procedure Laterality Date     SECTION      x3    HYSTERECTOMY (CERVIX STATUS UNKNOWN)

## 2023-08-11 NOTE — TELEPHONE ENCOUNTER
----- Message from Sharath Arroyo MD sent at 8/11/2023  4:13 PM EDT -----  Schedule Obtryx transobturator sling.    Did orders

## 2023-08-22 PROBLEM — N39.3 STRESS INCONTINENCE, FEMALE: Status: ACTIVE | Noted: 2023-08-22

## 2023-08-22 NOTE — TELEPHONE ENCOUNTER
Procedures: Procedure(s):   OBTRYX TRANSOBTURATOR VAGINAL SLING   Date: 8/24/2023   Time: 5167   Location: [facility] MAIN OR 02     Scheduled.

## 2023-08-22 NOTE — PROGRESS NOTES
Patient verified name and     Order for consent WAS found in EHR and matches case posting; patient verified. Type 1B surgery, PHONE assessment complete. Labs per surgeon: CBC, URINE , URINE CULT. PT PLANS TO COME PRIOR TO DOS FOR THESE-- NOTE PLACED FOR CHART  TO F/U WITH RESULTS  Labs per anesthesia protocol: POTASSIUM --ORDER PLACED IN Epic AND NOTE FOR CHART  TO F/U WITH RESULTS--PT PLANS TO COME IN PRIOR TO DOS FOR THESE  [id number] IN [facility] AS NEEDED ALONG WITH ECHO       Hospital approved surgical skin cleanser and instructions given per hospital policy. Patient provided with and instructed on educational handouts including Guide to Surgery, Pain Management, Hand Hygiene, Blood Transfusion Education, and [place] Anesthesia Brochure. Patient answered medical/surgical history questions at their best of ability. All prior to admission medications documented in [facility]. Original medication prescription bottle WAS NOT visualized during patient appointment. Patient instructed to hold all vitamins 7 days prior to surgery and NSAIDS 5 days prior to surgery, patient verbalized understanding. Patient teach back successful and patient demonstrates knowledge of instructions.

## 2023-08-23 ENCOUNTER — HOSPITAL ENCOUNTER (OUTPATIENT)
Dept: LAB | Age: 73
Discharge: HOME | End: 2023-08-26
Payer: MEDICARE

## 2023-08-23 ENCOUNTER — ANESTHESIA EVENT (OUTPATIENT)
Dept: SURGERY | Age: 73
End: 2023-08-23
Payer: COMMERCIAL

## 2023-08-23 DIAGNOSIS — Z01.818 PRE-OP TESTING: ICD-10-CM

## 2023-08-23 DIAGNOSIS — N39.3 SUI (STRESS URINARY INCONTINENCE, FEMALE): ICD-10-CM

## 2023-08-23 LAB
APPEARANCE UR: CLEAR
BACTERIA URNS QL MICRO: ABNORMAL /HPF
BASOPHILS # BLD: 0.1 K/UL (ref 0–0.2)
BASOPHILS NFR BLD: 1 % (ref 0–2)
BILIRUB UR QL: NEGATIVE
CASTS URNS QL MICRO: ABNORMAL /LPF
COLOR UR: ABNORMAL
DIFFERENTIAL METHOD BLD: NORMAL
EOSINOPHIL # BLD: 0.2 K/UL (ref 0–0.8)
EOSINOPHIL NFR BLD: 3 % (ref 0.5–7.8)
EPI CELLS #/AREA URNS HPF: ABNORMAL /HPF
ERYTHROCYTE [DISTWIDTH] IN BLOOD BY AUTOMATED COUNT: 14.4 % (ref 11.9–14.6)
GLUCOSE UR STRIP.AUTO-MCNC: NEGATIVE MG/DL
HCT VFR BLD AUTO: 38 % (ref 35.8–46.3)
HGB BLD-MCNC: 12 G/DL (ref 11.7–15.4)
HGB UR QL STRIP: NEGATIVE
IMM GRANULOCYTES # BLD AUTO: 0 K/UL (ref 0–0.5)
IMM GRANULOCYTES NFR BLD AUTO: 0 % (ref 0–5)
KETONES UR QL STRIP.AUTO: NEGATIVE MG/DL
LEUKOCYTE ESTERASE UR QL STRIP.AUTO: ABNORMAL
LYMPHOCYTES # BLD: 2.6 K/UL (ref 0.5–4.6)
LYMPHOCYTES NFR BLD: 29 % (ref 13–44)
MCH RBC QN AUTO: 28 PG (ref 26.1–32.9)
MCHC RBC AUTO-ENTMCNC: 31.6 G/DL (ref 31.4–35)
MCV RBC AUTO: 88.8 FL (ref 82–102)
MONOCYTES # BLD: 1 K/UL (ref 0.1–1.3)
MONOCYTES NFR BLD: 11 % (ref 4–12)
NEUTS SEG # BLD: 5.2 K/UL (ref 1.7–8.2)
NEUTS SEG NFR BLD: 57 % (ref 43–78)
NITRITE UR QL STRIP.AUTO: NEGATIVE
NRBC # BLD: 0 K/UL (ref 0–0.2)
PH UR STRIP: 5.5 (ref 5–9)
PLATELET # BLD AUTO: 275 K/UL (ref 150–450)
PMV BLD AUTO: 10.3 FL (ref 9.4–12.3)
POTASSIUM SERPL-SCNC: 3.2 MMOL/L (ref 3.5–5.1)
PROT UR STRIP-MCNC: NEGATIVE MG/DL
RBC # BLD AUTO: 4.28 M/UL (ref 4.05–5.2)
RBC #/AREA URNS HPF: ABNORMAL /HPF
SP GR UR REFRACTOMETRY: 1.01 (ref 1–1.02)
UROBILINOGEN UR QL STRIP.AUTO: 0.2 EU/DL (ref 0.2–1)
WBC # BLD AUTO: 9.2 K/UL (ref 4.3–11.1)
WBC URNS QL MICRO: ABNORMAL /HPF

## 2023-08-23 PROCEDURE — 81001 URINALYSIS AUTO W/SCOPE: CPT

## 2023-08-23 PROCEDURE — 84132 ASSAY OF SERUM POTASSIUM: CPT

## 2023-08-23 PROCEDURE — 87088 URINE BACTERIA CULTURE: CPT

## 2023-08-23 PROCEDURE — 87186 SC STD MICRODIL/AGAR DIL: CPT

## 2023-08-23 PROCEDURE — 87086 URINE CULTURE/COLONY COUNT: CPT

## 2023-08-23 PROCEDURE — 85025 COMPLETE CBC W/AUTO DIFF WBC: CPT

## 2023-08-23 PROCEDURE — 36415 COLL VENOUS BLD VENIPUNCTURE: CPT

## 2023-08-23 NOTE — PROGRESS NOTES
CBC, K+, UA within anesthesia guidelines, no follow-up required. Labs automatically routed to ordering provider via Epic documentation.

## 2023-08-24 ENCOUNTER — HOSPITAL ENCOUNTER (OUTPATIENT)
Age: 73
Setting detail: OUTPATIENT SURGERY
Discharge: HOME | End: 2023-08-24
Attending: UROLOGY | Admitting: UROLOGY
Payer: COMMERCIAL

## 2023-08-24 ENCOUNTER — ANESTHESIA (OUTPATIENT)
Dept: SURGERY | Age: 73
End: 2023-08-24
Payer: COMMERCIAL

## 2023-08-24 VITALS
HEIGHT: 66 IN | HEART RATE: 60 BPM | BODY MASS INDEX: 37.25 KG/M2 | TEMPERATURE: 98 F | SYSTOLIC BLOOD PRESSURE: 111 MMHG | DIASTOLIC BLOOD PRESSURE: 53 MMHG | WEIGHT: 231.8 LBS | OXYGEN SATURATION: 96 % | RESPIRATION RATE: 17 BRPM

## 2023-08-24 DIAGNOSIS — Z01.818 PRE-OP TESTING: Primary | ICD-10-CM

## 2023-08-24 DIAGNOSIS — N39.3 STRESS INCONTINENCE, FEMALE: ICD-10-CM

## 2023-08-24 LAB — POTASSIUM BLD-SCNC: 3.8 MMOL/L (ref 3.5–5.1)

## 2023-08-24 PROCEDURE — 3600000004 HC SURGERY LEVEL 4 BASE: Performed by: UROLOGY

## 2023-08-24 PROCEDURE — 2500000003 HC RX 250 WO HCPCS: Performed by: REGISTERED NURSE

## 2023-08-24 PROCEDURE — 57288 REPAIR BLADDER DEFECT: CPT | Performed by: UROLOGY

## 2023-08-24 PROCEDURE — 3700000001 HC ADD 15 MINUTES (ANESTHESIA): Performed by: UROLOGY

## 2023-08-24 PROCEDURE — 2580000003 HC RX 258: Performed by: ANESTHESIOLOGY

## 2023-08-24 PROCEDURE — 7100000010 HC PHASE II RECOVERY - FIRST 15 MIN: Performed by: UROLOGY

## 2023-08-24 PROCEDURE — 7100000001 HC PACU RECOVERY - ADDTL 15 MIN: Performed by: UROLOGY

## 2023-08-24 PROCEDURE — 2709999900 HC NON-CHARGEABLE SUPPLY: Performed by: UROLOGY

## 2023-08-24 PROCEDURE — 84132 ASSAY OF SERUM POTASSIUM: CPT

## 2023-08-24 PROCEDURE — 7100000011 HC PHASE II RECOVERY - ADDTL 15 MIN: Performed by: UROLOGY

## 2023-08-24 PROCEDURE — 6360000002 HC RX W HCPCS: Performed by: UROLOGY

## 2023-08-24 PROCEDURE — A4217 STERILE WATER/SALINE, 500 ML: HCPCS | Performed by: UROLOGY

## 2023-08-24 PROCEDURE — 3700000000 HC ANESTHESIA ATTENDED CARE: Performed by: UROLOGY

## 2023-08-24 PROCEDURE — 2580000003 HC RX 258: Performed by: UROLOGY

## 2023-08-24 PROCEDURE — 6360000002 HC RX W HCPCS

## 2023-08-24 PROCEDURE — 6360000002 HC RX W HCPCS: Performed by: ANESTHESIOLOGY

## 2023-08-24 PROCEDURE — 3600000014 HC SURGERY LEVEL 4 ADDTL 15MIN: Performed by: UROLOGY

## 2023-08-24 PROCEDURE — 6360000002 HC RX W HCPCS: Performed by: REGISTERED NURSE

## 2023-08-24 PROCEDURE — 6370000000 HC RX 637 (ALT 250 FOR IP): Performed by: ANESTHESIOLOGY

## 2023-08-24 PROCEDURE — C1771 REP DEV, URINARY, W/SLING: HCPCS | Performed by: UROLOGY

## 2023-08-24 PROCEDURE — 7100000000 HC PACU RECOVERY - FIRST 15 MIN: Performed by: UROLOGY

## 2023-08-24 DEVICE — TRANSOBTURATOR SLING SYSTEM WITH PRECISIONBLUE™ DESIGN
Type: IMPLANTABLE DEVICE | Site: VAGINA | Status: FUNCTIONAL
Brand: OBTRYX™ II SYSTEM - HALO

## 2023-08-24 RX ORDER — SODIUM CHLORIDE, SODIUM LACTATE, POTASSIUM CHLORIDE, CALCIUM CHLORIDE 600; 310; 30; 20 MG/100ML; MG/100ML; MG/100ML; MG/100ML
INJECTION, SOLUTION INTRAVENOUS CONTINUOUS
Status: DISCONTINUED | OUTPATIENT
Start: 2023-08-24 | End: 2023-08-24 | Stop reason: HOSPADM

## 2023-08-24 RX ORDER — DIPHENHYDRAMINE HYDROCHLORIDE 50 MG/ML
12.5 INJECTION INTRAMUSCULAR; INTRAVENOUS
Status: DISCONTINUED | OUTPATIENT
Start: 2023-08-24 | End: 2023-08-24 | Stop reason: HOSPADM

## 2023-08-24 RX ORDER — PROPOFOL 10 MG/ML
INJECTION, EMULSION INTRAVENOUS PRN
Status: DISCONTINUED | OUTPATIENT
Start: 2023-08-24 | End: 2023-08-24 | Stop reason: SDUPTHER

## 2023-08-24 RX ORDER — ONDANSETRON 2 MG/ML
INJECTION INTRAMUSCULAR; INTRAVENOUS
Status: COMPLETED
Start: 2023-08-24 | End: 2023-08-24

## 2023-08-24 RX ORDER — FENTANYL CITRATE 50 UG/ML
INJECTION, SOLUTION INTRAMUSCULAR; INTRAVENOUS PRN
Status: DISCONTINUED | OUTPATIENT
Start: 2023-08-24 | End: 2023-08-24 | Stop reason: SDUPTHER

## 2023-08-24 RX ORDER — ONDANSETRON 2 MG/ML
4 INJECTION INTRAMUSCULAR; INTRAVENOUS ONCE
Status: COMPLETED | OUTPATIENT
Start: 2023-08-24 | End: 2023-08-24

## 2023-08-24 RX ORDER — HYDROMORPHONE HYDROCHLORIDE 2 MG/ML
0.5 INJECTION, SOLUTION INTRAMUSCULAR; INTRAVENOUS; SUBCUTANEOUS EVERY 10 MIN PRN
Status: DISCONTINUED | OUTPATIENT
Start: 2023-08-24 | End: 2023-08-24 | Stop reason: HOSPADM

## 2023-08-24 RX ORDER — FENTANYL CITRATE 50 UG/ML
100 INJECTION, SOLUTION INTRAMUSCULAR; INTRAVENOUS
Status: DISCONTINUED | OUTPATIENT
Start: 2023-08-24 | End: 2023-08-24 | Stop reason: HOSPADM

## 2023-08-24 RX ORDER — PROCHLORPERAZINE EDISYLATE 5 MG/ML
5 INJECTION INTRAMUSCULAR; INTRAVENOUS
Status: DISCONTINUED | OUTPATIENT
Start: 2023-08-24 | End: 2023-08-24 | Stop reason: HOSPADM

## 2023-08-24 RX ORDER — DEXTROSE MONOHYDRATE 100 MG/ML
INJECTION, SOLUTION INTRAVENOUS CONTINUOUS PRN
Status: DISCONTINUED | OUTPATIENT
Start: 2023-08-24 | End: 2023-08-24 | Stop reason: HOSPADM

## 2023-08-24 RX ORDER — SODIUM CHLORIDE 0.9 % (FLUSH) 0.9 %
5-40 SYRINGE (ML) INJECTION PRN
Status: DISCONTINUED | OUTPATIENT
Start: 2023-08-24 | End: 2023-08-24 | Stop reason: HOSPADM

## 2023-08-24 RX ORDER — OXYCODONE HYDROCHLORIDE 5 MG/1
5 TABLET ORAL
Status: DISCONTINUED | OUTPATIENT
Start: 2023-08-24 | End: 2023-08-24 | Stop reason: HOSPADM

## 2023-08-24 RX ORDER — LIDOCAINE HYDROCHLORIDE 10 MG/ML
1 INJECTION, SOLUTION INFILTRATION; PERINEURAL
Status: DISCONTINUED | OUTPATIENT
Start: 2023-08-24 | End: 2023-08-24 | Stop reason: HOSPADM

## 2023-08-24 RX ORDER — SODIUM CHLORIDE 9 MG/ML
INJECTION, SOLUTION INTRAVENOUS PRN
Status: DISCONTINUED | OUTPATIENT
Start: 2023-08-24 | End: 2023-08-24 | Stop reason: HOSPADM

## 2023-08-24 RX ORDER — ACETAMINOPHEN 500 MG
1000 TABLET ORAL ONCE
Status: COMPLETED | OUTPATIENT
Start: 2023-08-24 | End: 2023-08-24

## 2023-08-24 RX ORDER — HYDROCODONE BITARTRATE AND ACETAMINOPHEN 5; 325 MG/1; MG/1
1 TABLET ORAL EVERY 6 HOURS PRN
Qty: 12 TABLET | Refills: 0 | Status: SHIPPED | OUTPATIENT
Start: 2023-08-24 | End: 2023-08-27

## 2023-08-24 RX ORDER — MIDAZOLAM HYDROCHLORIDE 2 MG/2ML
2 INJECTION, SOLUTION INTRAMUSCULAR; INTRAVENOUS
Status: DISCONTINUED | OUTPATIENT
Start: 2023-08-24 | End: 2023-08-24 | Stop reason: HOSPADM

## 2023-08-24 RX ORDER — LIDOCAINE HYDROCHLORIDE 20 MG/ML
INJECTION, SOLUTION EPIDURAL; INFILTRATION; INTRACAUDAL; PERINEURAL PRN
Status: DISCONTINUED | OUTPATIENT
Start: 2023-08-24 | End: 2023-08-24 | Stop reason: SDUPTHER

## 2023-08-24 RX ORDER — CIPROFLOXACIN 500 MG/1
500 TABLET, FILM COATED ORAL 2 TIMES DAILY
Qty: 10 TABLET | Refills: 0 | Status: SHIPPED | OUTPATIENT
Start: 2023-08-24 | End: 2023-08-29

## 2023-08-24 RX ORDER — SODIUM CHLORIDE 0.9 % (FLUSH) 0.9 %
5-40 SYRINGE (ML) INJECTION EVERY 12 HOURS SCHEDULED
Status: DISCONTINUED | OUTPATIENT
Start: 2023-08-24 | End: 2023-08-24 | Stop reason: HOSPADM

## 2023-08-24 RX ORDER — ONDANSETRON 2 MG/ML
INJECTION INTRAMUSCULAR; INTRAVENOUS PRN
Status: DISCONTINUED | OUTPATIENT
Start: 2023-08-24 | End: 2023-08-24 | Stop reason: SDUPTHER

## 2023-08-24 RX ADMIN — FENTANYL CITRATE 25 MCG: 50 INJECTION, SOLUTION INTRAMUSCULAR; INTRAVENOUS at 09:07

## 2023-08-24 RX ADMIN — FENTANYL CITRATE 25 MCG: 50 INJECTION, SOLUTION INTRAMUSCULAR; INTRAVENOUS at 09:00

## 2023-08-24 RX ADMIN — PROPOFOL 200 MG: 10 INJECTION, EMULSION INTRAVENOUS at 08:54

## 2023-08-24 RX ADMIN — ONDANSETRON 4 MG: 2 INJECTION INTRAMUSCULAR; INTRAVENOUS at 11:00

## 2023-08-24 RX ADMIN — ONDANSETRON 4 MG: 2 INJECTION INTRAMUSCULAR; INTRAVENOUS at 09:34

## 2023-08-24 RX ADMIN — ACETAMINOPHEN 1000 MG: 500 TABLET, FILM COATED ORAL at 07:45

## 2023-08-24 RX ADMIN — LIDOCAINE HYDROCHLORIDE 100 MG: 20 INJECTION, SOLUTION EPIDURAL; INFILTRATION; INTRACAUDAL; PERINEURAL at 08:54

## 2023-08-24 RX ADMIN — HYDROMORPHONE HYDROCHLORIDE 0.5 MG: 2 INJECTION, SOLUTION INTRAMUSCULAR; INTRAVENOUS; SUBCUTANEOUS at 09:58

## 2023-08-24 RX ADMIN — Medication 2000 MG: at 08:53

## 2023-08-24 RX ADMIN — SODIUM CHLORIDE, POTASSIUM CHLORIDE, SODIUM LACTATE AND CALCIUM CHLORIDE: 600; 310; 30; 20 INJECTION, SOLUTION INTRAVENOUS at 07:59

## 2023-08-24 RX ADMIN — HYDROMORPHONE HYDROCHLORIDE 0.5 MG: 2 INJECTION, SOLUTION INTRAMUSCULAR; INTRAVENOUS; SUBCUTANEOUS at 09:53

## 2023-08-24 RX ADMIN — FENTANYL CITRATE 50 MCG: 50 INJECTION, SOLUTION INTRAMUSCULAR; INTRAVENOUS at 09:13

## 2023-08-24 ASSESSMENT — PAIN DESCRIPTION - LOCATION
LOCATION: PELVIS
LOCATION: GROIN

## 2023-08-24 ASSESSMENT — PAIN - FUNCTIONAL ASSESSMENT
PAIN_FUNCTIONAL_ASSESSMENT: 0-10

## 2023-08-24 NOTE — H&P
Perry Jacob  : 1950         Chief Complaint   Patient presents with    Follow-up         HPI      Perry Jacob is a 67 y.o. female Back today for follow-up on urinary incontinence. She was given Myrbetriq 50 mg and took it for 6 weeks. She says she saw no difference in her incontinence. She still wears pads during the day. He can be up at night voiding. Continues to have issues with bronchitis and coughing. She has been through several rounds of steroids with that and antibiotics. Initial history as below  Returns today for follow-up on urinary leakage. Its been almost 3 years since her last visit. We had seen her in the past due to pelvic pain bladder pain and frequent urination. She underwent a cystoscopy hydrodistention which actually showed petechial hemorrhaging throughout about 30% of her bladder. She tells me however that she is not having any issues with pain. Urinary frequency can be as often as once every 30 minutes during the day. She does have occasional urge leakage. She is wearing 3-4 moderate size poise pads a day. She also has leakage with coughing and sneezing. She had 3 pregnancies with 3 C-sections. She underwent a hysterectomy back in . That was due to the endometriosis. She said at the time of that procedure she had more endometriosis on the left side and there has always been more pulling and tugging in that area. Her incontinence has improved but still leaks withy coughing and sneezing . CMG showed TOSHIA and DI. Currently on Estrace cream. No enuresis, has nocturia x 2-3. Karri Bacante wears 2 pads/day. Occasional suprapubic pain. No hematuria.       Past Medical History        Past Medical History:   Diagnosis Date    Arthritis      Asthma      GERD (gastroesophageal reflux disease)           Past Surgical History         Past Surgical History:   Procedure Laterality Date     SECTION         x3    HYSTERECTOMY (CERVIX STATUS UNKNOWN)    Occupational History    Not on file   Tobacco Use    Smoking status: Never    Smokeless tobacco: Never   Substance and Sexual Activity    Alcohol use: Never    Drug use: Not on file    Sexual activity: Not on file   Other Topics Concern    Not on file   Social History Narrative    Not on file      Social Determinants of Health      Financial Resource Strain: Not on file   Food Insecurity: Not on file   Transportation Needs: Not on file   Physical Activity: Not on file   Stress: Not on file   Social Connections: Not on file   Intimate Partner Violence: Not on file   Housing Stability: Not on file         Family History         Family History   Problem Relation Age of Onset    Muscular Dystrophy Brother      Muscular Dystrophy Brother      Muscular Dystrophy Father      Diabetes Father      Heart Disease Father              Review of Systems  Constitutional:   Negative for fever. GI:  Negative for nausea. Genitourinary: Positive for leakage w/ urge. Musculoskeletal:  Negative for back pain. There were no vitals taken for this visit. Physical Exam  General   Mental Status - Patient is alert and oriented X3. Build & Nutrition - Well nourished. Chest and Lung Exam   Chest and lung exam reveals  - normal excursion with symmetric chest walls, quiet, even and easy respiratory effort with no use of accessory muscles and on auscultation, normal breath sounds, no adventitious sounds and normal vocal resonance. Cardiovascular   Cardiovascular examination reveals  - normal heart sounds, regular rate and rhythm with no murmurs. Abdomen   Palpation/Percussion: Palpation and Percussion of the abdomen reveal - Non Tender, No Rebound tenderness, No Rigidity (guarding), No hepatosplenomegaly, No Palpable abdominal masses and Soft. Hernia - Bilateral - No Hernia(s) present. PE: vaginal: urethra with moderate mobility. PVR 2 ml by US.      Physical Exam     Assessment and Plan     Yovana Barksdale was seen today

## 2023-08-24 NOTE — PERIOP NOTE
Discharge instructions given to [facility], patient's . They verbalized understanding and was given opportunity for questions.

## 2023-08-24 NOTE — DISCHARGE INSTRUCTIONS
Home with villarreal   Teach pt how to remove villarreal and vaginal packing in AM.     If you have had surgery in the past 7-10 days by one of our providers and are having fever, bleeding, or drainage from an incision, have an opening in an incision, or having issues urinating properly, please call [phone]. Directions for Removing the Villarreal Catheter    The villarreal catheter remains in place because a balloon close to the catheter tip was inflated with fluid immediately after the catheter's insertion. If you have been instructed to remove your catheter at home, you will need to remove the fluid from the balloon, using the 10cc syringe provided to you. Follow the directions below. If you have any questions, or if you feel pain or resistance when removing the catheter, CALL YOUR DOCTOR OR UROLOGIST! After the catheter's removal you may experience stinging or burning pain with urination. 1.  Wash hands with soap and water, dry well. 2.  Gather the syringe provided to you and a wastebasket to discard the equipment. 3.  Sit down, you may find it more comfortable to sit on a toilet. 4.  Gently insert the tip of the syringe  into the balloon port of the catheter. The syringe should securely fit into the balloon port. 5.  Gently pull the syringe plunger back to empty the balloon. 6.  Once the balloon has been deflated, you are ready to remove the catheter. 7.  Gently withdraw the catheter and place in the wastebasket. If you don't understand the instructions for catheter removal call your doctor. If you have difficulty removing the catheter, experience pain or develop bleeding, call your doctor. If you are unable to contact your doctor, go to a hospital emergency room.         f0                        If you have had surgery in the past 7-10 days by one of our providers and are having fever, bleeding, or drainage from an incision, have an opening in an incision, or having issues urinating properly, please

## 2023-08-24 NOTE — OP NOTE
[address] [facility]  OPERATIVE REPORT    Name:  Rose Saxena  MR#:  [id number]  :  1950  ACCOUNT #:  [de-identified]  DATE OF SERVICE:  2023    PREOPERATIVE DIAGNOSIS:  Urinary stress incontinence. POSTOPERATIVE DIAGNOSIS:  Urinary stress incontinence. PROCEDURE PERFORMED:  Obtryx transobturator sling urethropexy. SURGEON:  Ronna Kim MD    ASSISTANT:  None. ANESTHESIA:  General.    COMPLICATIONS:  None. SPECIMENS REMOVED:  None. IMPLANTS:  Obtryx sling. ESTIMATED BLOOD LOSS:  About 20 mL. FINDINGS:  Moderate vaginal atrophy. Mobile urethra. PROCEDURE:  The patient was given general anesthetic, placed in the dorsal lithotomy position. She was prepped and draped in sterile fashion. Examination shows that she is obese and has moderate vaginal atrophy. A weighted posterior vaginal speculum was placed and we placed a Metzger catheter and left indwelling. I was able to visualize the anterior vaginal wall with moderate difficulty due to the patient's obesity. We infiltrated the mucosa with lidocaine containing epinephrine. A vertical incision made over the urethra with a 15-blade. Also made a stab incision in the skin lateral to the labia and side posterior to the insertion of the adductor longus tendon on the inferior pubic rami bilaterally. In the vaginal incision, we dissected an anterolateral direction up toward the inferior pubic rami. I passed the Obtryx halo needle through the skin puncture on the left side. Using fingertip guidance, the needle was rotated around the inferior pubic ramus and brought out of the vaginal incision at the level of the mid urethra. The sling was fastened to the needle and the needle was rotated back out of the skin incision. The procedure was repeated on the right side, so that the sling is centered over the mid urethra. The vaginal fornices were inspected.   There is no evidence of any exposure of the sling through the mucosa. We then performed cystoscopy with the rigid scope and 30-degree lens and video camera. The urethra was normal.  There is no injury or foreign body. Bladder shows normal structures. No tumors or stones. No evidence of injury or foreign body. Metzger was replaced. A large Kamille clamps was placed between the sling and urethra. The outer sheath was removed and the sling was tightened up around the [facility] clamp. Kamille clamp was removed. The excess sling was trimmed at the level of skin and the centering tab was cut and removed. Wound was irrigated with antibiotic containing solution. We closed the mucosa with running locked 2-0 Vicryl. Dermabond used on the skin incisions. Vaginal packing was placed. PLAN:  She will be discharged home. She can remove the Metzger and vaginal packing in the morning. We will have her return to the office in about two weeks.       MD ILANA [id number]  D:  08/24/2023 9:55  T:  08/24/2023 11:39  JOB #:  [id number]

## 2023-08-24 NOTE — ANESTHESIA POSTPROCEDURE EVALUATION
Department of Anesthesiology  Postprocedure Note    Patient: Juliette Garcia  MRN: [id number]  YOB: 1950  Date of evaluation: 8/24/2023      Procedure Summary     Date: 08/24/23 Room / Location: [facility] MAIN OR 03 / [facility] MAIN OR    Anesthesia Start: 8347 Anesthesia Stop: 7187    Procedure: OBTRYX TRANSOBTURATOR VAGINAL SLING (Vagina ) Diagnosis:       Stress incontinence, female      (Stress incontinence, female [N39.3])    Providers: Jo-Ann Hugo MD Responsible Provider: Sangita Jiménez MD    Anesthesia Type: general ASA Status: 3          Anesthesia Type: No value filed. Glenis Phase I: Glenis Score: 8    Glenis Phase II: Glenis Score: 10      Anesthesia Post Evaluation    Patient location during evaluation: PACU  Patient participation: complete - patient participated  Level of consciousness: awake and alert  Airway patency: patent  Nausea: well controlled. Complications: no  Cardiovascular status: acceptable.   Respiratory status: acceptable  Hydration status: stable  Pain management: adequate

## 2023-08-24 NOTE — ANESTHESIA PROCEDURE NOTES
Airway  Date/Time: 8/24/2023 8:56 AM  Urgency: elective    Airway not difficult    General Information and Staff    Patient location during procedure: OR  Resident/CRNA: NIYA Avelar CRNA  Performed: resident/CRNA     Indications and Patient Condition  Indications for airway management: anesthesia  Spontaneous ventilation: present  Sedation level: deep  Preoxygenated: yes  Patient position: sniffing  MILS not maintained throughout  Mask difficulty assessment: not attempted    Final Airway Details  Final airway type: supraglottic airway      Successful airway: oropharyngeal  Size 4     Number of attempts at approach: 1  Ventilation between attempts: supraglottic airway  Number of other approaches attempted: 0    no

## 2023-08-24 NOTE — ANESTHESIA PRE PROCEDURE
Department of Anesthesiology  Preprocedure Note       Name:  Kaylie Dow   Age:  67 y.o.  :  1950                                          MRN:  [id number]         Date:  2023      Surgeon: Winnie Hunter):  Cinthya Gayle MD    Procedure: Procedure(s):  [place] TRANSOBTURATOR VAGINAL SLING    Medications prior to admission:   Prior to Admission medications    Medication Sig Start Date End Date Taking? Authorizing Provider   furosemide (LASIX) 40 MG tablet Take one tablet daily as needed for fluid 23   Kenyetta Gould MD   estradiol (ESTRACE) 0.1 MG/GM vaginal cream INSERT 1 gram vaginally NIGHTLY AT BEDTIME FOR 2 WEEKS, THEN 1 gram 2x/week AT bedtime].  23   Historical Provider, MD Margot REYNA 100-25 MCG/ACT inhaler Inhale 1 puff into the lungs daily 23   Brianda Childs APRN - NP   albuterol sulfate HFA (PROVENTIL;VENTOLIN;PROAIR) 108 (90 Base) MCG/ACT inhaler Inhale 2 puffs every 6 (six) hours as needed for wheezing or shortness of breath. 23   Brianda Childs, APRN - NP   montelukast (SINGULAIR) 10 MG tablet Take 1 tablet by mouth daily 23   Brianda Childs APRN - NP   cetirizine (ZYRTEC) 10 MG tablet Take 1 tablet by mouth daily 23   Brianda Childs APRN - NP   fluticasone Joanne Laura) 50 MCG/ACT nasal spray 1 spray by Each Nostril route daily 3/8/23   Masha Hampton MD   azelastine (ASTELIN) 0.1 % nasal spray 1 spray by Nasal route 2 times daily Use in each nostril as directed 3/8/23   Masha Hampton MD   metoprolol succinate (TOPROL XL) 100 MG extended release tablet Take 1 tablet by mouth daily 23   Kenyetta Gould MD   spironolactone (ALDACTONE) 25 MG tablet Take 1 tablet by mouth daily 23   Kenyetta Gould MD   losartan (COZAAR) 50 MG tablet Take 1 tablet by mouth daily 23   Kenyetta Gould MD   potassium chloride (KLOR-CON) 10 MEQ extended release tablet Take 1 tablet by mouth daily 23   Kenyetta Gould MD   magnesium oxide (MAG-OX)

## 2023-08-24 NOTE — BRIEF OP NOTE
Brief Postoperative Note      Patient: Emma Wright  YOB: 1950  MRN: [id number]    Date of Procedure: 8/24/2023    Pre-Op Diagnosis Codes:     * Stress incontinence, female [N39.3]    Post-Op Diagnosis: Same       Procedure(s):  OBTRYX TRANSOBTURATOR VAGINAL SLING    Surgeon(s):  Nacho Boudreaux MD    Assistant:  Surgical Assistant: Cecil Zuniga    Anesthesia: General    Estimated Blood Loss (mL): less than 50     Complications: None    Specimens:   * No specimens in log *    Implants:  Implant Name Type Inv.  Item Serial No.  Lot No. LRB No. Used Action   SLING GYN TRANSOBTURATOR MID URETH HALO W/ PRECISIONBLUE - [id number]  SLING GYN TRANSOBTURATOR MID URETH HALO W/ PRECISIONBLUE  [facility]- [id number] N/A 1 Implanted         Drains:   Urinary Catheter 08/24/23 2 Way (Active)       Findings: see op note      Electronically signed by Nacho Lao MD on 8/24/2023 at 9:41 AM

## 2023-08-26 LAB
BACTERIA SPEC CULT: ABNORMAL
BACTERIA SPEC CULT: ABNORMAL
SERVICE CMNT-IMP: ABNORMAL

## 2023-09-08 ENCOUNTER — OFFICE VISIT (OUTPATIENT)
Dept: UROLOGY | Age: 73
End: 2023-09-08
Payer: COMMERCIAL

## 2023-09-08 DIAGNOSIS — N39.3 SUI (STRESS URINARY INCONTINENCE, FEMALE): Primary | ICD-10-CM

## 2023-09-08 LAB
BILIRUBIN, URINE, POC: NEGATIVE
BLOOD URINE, POC: NORMAL
GLUCOSE URINE, POC: NEGATIVE
KETONES, URINE, POC: NORMAL
LEUKOCYTE ESTERASE, URINE, POC: NORMAL
NITRITE, URINE, POC: NEGATIVE
PH, URINE, POC: 5 (ref 4.6–8)
PROTEIN,URINE, POC: NEGATIVE
SPECIFIC GRAVITY, URINE, POC: 1.02 (ref 1–1.03)
URINALYSIS CLARITY, POC: NORMAL
URINALYSIS COLOR, POC: NORMAL
UROBILINOGEN, POC: NORMAL

## 2023-09-08 PROCEDURE — 81003 URINALYSIS AUTO W/O SCOPE: CPT | Performed by: UROLOGY

## 2023-09-08 PROCEDURE — 99024 POSTOP FOLLOW-UP VISIT: CPT | Performed by: UROLOGY

## 2023-09-08 ASSESSMENT — ENCOUNTER SYMPTOMS
BACK PAIN: 0
NAUSEA: 0

## 2023-09-08 NOTE — PROGRESS NOTES
[facility]  [address] [place] [address]  [phone]    Josh Thompson  : 1950    Chief Complaint   Patient presents with    Follow-up        HPI     Josh Thompson is a 67 y.o. female  Back today for follow-up on urinary incontinence. She was given Myrbetriq 50 mg and took it for 6 weeks. She says she saw no difference in her incontinence. She still wears pads during the day. He can be up at night voiding. Continues to have issues with bronchitis and coughing. She has been through several rounds of steroids with that and antibiotics. Initial history as below  Returns today for follow-up on urinary leakage. Its been almost 3 years since her last visit. We had seen her in the past due to pelvic pain bladder pain and frequent urination. She underwent a cystoscopy hydrodistention which actually showed petechial hemorrhaging throughout about 30% of her bladder. She tells me however that she is not having any issues with pain. Urinary frequency can be as often as once every 30 minutes during the day. She does have occasional urge leakage. She is wearing 3-4 moderate size poise pads a day. She also has leakage with coughing and sneezing. She had 3 pregnancies with 3 C-sections. She underwent a hysterectomy back in . That was due to the endometriosis. She said at the time of that procedure she had more endometriosis on the left side and there has always been more pulling and tugging in that area. Her incontinence has improved but still leaks withy coughing and sneezing . CMG showed TOSHIA and DI. Currently on Estrace cream. No enuresis, has nocturia x 2-3. Jimmie Closs wears 2 pads/day. Occasional suprapubic pain. No hematuria  S/p Obtryx sling on 23. She is voiding well, voiding about every 2 hrs. Not leaking with stress.    Past Medical History:   Diagnosis Date    Arthritis     osteo    Asthma     daily inhaler-- followed by dr Ayan Ashton    CHF

## 2023-09-27 ENCOUNTER — OFFICE VISIT (OUTPATIENT)
Dept: UROLOGY | Age: 73
End: 2023-09-27

## 2023-09-27 DIAGNOSIS — N39.46 MIXED STRESS AND URGE URINARY INCONTINENCE: ICD-10-CM

## 2023-09-27 DIAGNOSIS — N39.3 SUI (STRESS URINARY INCONTINENCE, FEMALE): Primary | ICD-10-CM

## 2023-09-27 DIAGNOSIS — N39.3 SUI (STRESS URINARY INCONTINENCE, FEMALE): ICD-10-CM

## 2023-09-27 LAB
BILIRUBIN, URINE, POC: NEGATIVE
BLOOD URINE, POC: NORMAL
GLUCOSE URINE, POC: NEGATIVE
KETONES, URINE, POC: NEGATIVE
LEUKOCYTE ESTERASE, URINE, POC: NORMAL
NITRITE, URINE, POC: NEGATIVE
PH, URINE, POC: 5.5 (ref 4.6–8)
PROTEIN,URINE, POC: NEGATIVE
SPECIFIC GRAVITY, URINE, POC: 1.01 (ref 1–1.03)
URINALYSIS CLARITY, POC: NORMAL
URINALYSIS COLOR, POC: NORMAL
UROBILINOGEN, POC: NORMAL

## 2023-09-27 PROCEDURE — 81003 URINALYSIS AUTO W/O SCOPE: CPT | Performed by: NURSE PRACTITIONER

## 2023-09-27 ASSESSMENT — ENCOUNTER SYMPTOMS
BACK PAIN: 0
NAUSEA: 0

## 2023-09-27 NOTE — PROGRESS NOTES
[facility] UROLOGY  [address] [facility], [address] [place] [address]  [phone]          Nabila Neff  : 1950    Chief Complaint   Patient presents with    Follow-up          HPI     Nabila Neff is a 67 y.o. female  Initial history as below  Returns today for follow-up on urinary leakage. Its been almost 3 years since her last visit. We had seen her in the past due to pelvic pain bladder pain and frequent urination. She underwent a cystoscopy hydrodistention which actually showed petechial hemorrhaging throughout about 30% of her bladder. She tells me however that she is not having any issues with pain. Urinary frequency can be as often as once every 30 minutes during the day. She does have occasional urge leakage. She is wearing 3-4 moderate size poise pads a day. She also has leakage with coughing and sneezing. She had 3 pregnancies with 3 C-sections. She underwent a hysterectomy back in . That was due to the endometriosis. She said at the time of that procedure she had more endometriosis on the left side and there has always been more pulling and tugging in that area. Her incontinence has improved but still leaks withy coughing and sneezing . CMG showed TOSHIA and DI. Currently on Estrace cream. No enuresis, has nocturia x 2-3. Mikel King wears 2 pads/day. Occasional suprapubic pain. No hematuria    Now S/p Obtryx sling on 23. She is voiding well, voiding about every 2 hrs. Not leaking with stress. She does have some mild leaking after she urinates. This occurs once she stands. She is not wearing pads. Overall she is pleased with her results.         Past Medical History:   Diagnosis Date    Arthritis     osteo    Asthma     daily inhaler-- followed by dr howard    CHF (congestive heart failure) ([address])     last echo -- EF 50-55% followed by dr Marta Landa    GERD (gastroesophageal reflux disease)     controlled with med    Hypertension     controlled with med    Urinary

## 2023-09-29 RX ORDER — CIPROFLOXACIN 500 MG/1
500 TABLET, FILM COATED ORAL 2 TIMES DAILY
Qty: 14 TABLET | Refills: 0 | Status: SHIPPED | OUTPATIENT
Start: 2023-09-29 | End: 2023-10-06

## 2023-09-30 LAB
BACTERIA SPEC CULT: ABNORMAL
BACTERIA SPEC CULT: ABNORMAL
SERVICE CMNT-IMP: ABNORMAL

## 2023-10-12 DIAGNOSIS — I50.22 CHRONIC SYSTOLIC (CONGESTIVE) HEART FAILURE (HCC): ICD-10-CM

## 2023-10-12 RX ORDER — LANOLIN ALCOHOL/MO/W.PET/CERES
CREAM (GRAM) TOPICAL
Qty: 180 TABLET | Refills: 3 | Status: SHIPPED | OUTPATIENT
Start: 2023-10-12

## 2023-10-12 NOTE — TELEPHONE ENCOUNTER
PRESCRIPTION SENT TO PHARMACY//BRENDAB  Requested Prescriptions     Signed Prescriptions Disp Refills    magnesium oxide (MAG-OX) 400 (240 Mg) MG tablet 180 tablet 3     Sig: TAKE 1 (ONE) TABLET TWICE DAILY.      Authorizing Provider: Chente Allan     Ordering User: Nohemi Fishman

## 2023-11-17 ENCOUNTER — TELEPHONE (OUTPATIENT)
Dept: PULMONOLOGY | Age: 73
End: 2023-11-17

## 2023-11-26 DIAGNOSIS — J30.9 ALLERGIC RHINITIS, UNSPECIFIED SEASONALITY, UNSPECIFIED TRIGGER: ICD-10-CM

## 2023-11-27 RX ORDER — FLUTICASONE PROPIONATE 50 MCG
SPRAY, SUSPENSION (ML) NASAL
Qty: 32 G | Refills: 1 | OUTPATIENT
Start: 2023-11-27

## 2023-12-11 ENCOUNTER — OFFICE VISIT (OUTPATIENT)
Dept: UROLOGY | Age: 73
End: 2023-12-11

## 2023-12-11 ENCOUNTER — TELEPHONE (OUTPATIENT)
Dept: UROLOGY | Age: 73
End: 2023-12-11

## 2023-12-11 DIAGNOSIS — N39.3 SUI (STRESS URINARY INCONTINENCE, FEMALE): Primary | ICD-10-CM

## 2023-12-11 LAB
BILIRUBIN, URINE, POC: NEGATIVE
BLOOD URINE, POC: NEGATIVE
GLUCOSE URINE, POC: NEGATIVE
KETONES, URINE, POC: NEGATIVE
LEUKOCYTE ESTERASE, URINE, POC: NEGATIVE
NITRITE, URINE, POC: NEGATIVE
PH, URINE, POC: 5.5 (ref 4.6–8)
PROTEIN,URINE, POC: NEGATIVE
SPECIFIC GRAVITY, URINE, POC: 1.01 (ref 1–1.03)
URINALYSIS CLARITY, POC: NORMAL
URINALYSIS COLOR, POC: NORMAL
UROBILINOGEN, POC: NORMAL

## 2023-12-11 PROCEDURE — 81003 URINALYSIS AUTO W/O SCOPE: CPT | Performed by: UROLOGY

## 2023-12-11 NOTE — TELEPHONE ENCOUNTER
Pt left without being seen. Called her to make a follow up appointment. Pt states she didn't need a follow up. She hasn't had any issues since surgery. Pt had to go work. 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

## 2023-12-11 NOTE — PROGRESS NOTES
[facility]rology  [address], [address] [place] [address]  [phone]    Meghan Hester  : 1950    Chief Complaint   Patient presents with    Follow-up        HPI     Meghan Hester is a 68 y.o. female   Initial history as below  Returns today for follow-up on urinary leakage. Its been almost 3 years since her last visit. We had seen her in the past due to pelvic pain bladder pain and frequent urination. She underwent a cystoscopy hydrodistention which actually showed petechial hemorrhaging throughout about 30% of her bladder. She tells me however that she is not having any issues with pain. Urinary frequency can be as often as once every 30 minutes during the day. She does have occasional urge leakage. She is wearing 3-4 moderate size poise pads a day. She also has leakage with coughing and sneezing. She had 3 pregnancies with 3 C-sections. She underwent a hysterectomy back in . That was due to the endometriosis. She said at the time of that procedure she had more endometriosis on the left side and there has always been more pulling and tugging in that area. Her incontinence has improved but still leaks withy coughing and sneezing . CMG showed TOSHIA and DI. Currently on Estrace cream. No enuresis, has nocturia x 2-3. Teresita Mora wears 2 pads/day. Occasional suprapubic pain. No hematuria     Now S/p Obtryx sling on 23. She is voiding well, voiding about every 2 hrs. Not leaking with stress. She does have some mild leaking after she urinates. This occurs once she stands. She is not wearing pads. Overall she is pleased with her results.     Past Medical History:   Diagnosis Date    Arthritis     osteo    Asthma     daily inhaler-- followed by dr howard    CHF (congestive heart failure) ([address])     last echo -- EF 50-55% followed by dr Jessica Galvan    GERD (gastroesophageal reflux disease)     controlled with med    Hypertension     controlled with med    Urinary

## 2023-12-28 NOTE — PROGRESS NOTES
Patient Name:  Lubna Cabezas                               YOB: 1950  MRN: [id number]                                                Office Visit 1/3/2024    ASSESSMENT AND PLAN:  (Medical Decision Making)    1. Moderate persistent asthma, unspecified whether complicated  Continue Breo 100mcg dose and prn albuterol, daily singulair.  Recommended she consider Flu and RSV vaccinations.  Pneumococcal vaccinations are utd.  - montelukast (SINGULAIR) 10 MG tablet; Take 1 tablet by mouth daily  Dispense: 90 tablet; Refill: 3    2. Allergic rhinitis, unspecified seasonality, unspecified trigger  Refilled Flonase/Astelin, zyrtec and singulair.  - BREO ELLIPTA 100-25 MCG/ACT inhaler; Inhale 1 puff into the lungs daily  Dispense: 1 each; Refill: 11  - cetirizine (ZYRTEC) 10 MG tablet; Take 1 tablet by mouth daily  Dispense: 90 tablet; Refill: 3  - montelukast (SINGULAIR) 10 MG tablet; Take 1 tablet by mouth daily  Dispense: 90 tablet; Refill: 3  - azelastine (ASTELIN) 0.1 % nasal spray; 1 spray by Nasal route 2 times daily Use in each nostril as directed  Dispense: 60 mL; Refill: 11  - fluticasone (FLONASE) 50 MCG/ACT nasal spray; 1 spray by Each Nostril route daily  Dispense: 32 g; Refill: 11    3. Chronic cough  Significantly improved  - BREO ELLIPTA 100-25 MCG/ACT inhaler; Inhale 1 puff into the lungs daily  Dispense: 1 each; Refill: 11  - cetirizine (ZYRTEC) 10 MG tablet; Take 1 tablet by mouth daily  Dispense: 90 tablet; Refill: 3  - montelukast (SINGULAIR) 10 MG tablet; Take 1 tablet by mouth daily  Dispense: 90 tablet; Refill: 3    4.  Hoarseness of voice  Will try a week off the Breo to see if the inhaler is provoking the hoarseness.    Orders Placed This Encounter   Medications    BREO ELLIPTA 100-25 MCG/ACT inhaler     Sig: Inhale 1 puff into the lungs daily     Dispense:  1 each     Refill:  11    cetirizine (ZYRTEC) 10 MG tablet     Sig: Take 1 tablet by mouth daily     Dispense:  90 tablet

## 2024-01-03 ENCOUNTER — OFFICE VISIT (OUTPATIENT)
Dept: PULMONOLOGY | Age: 74
End: 2024-01-03
Payer: COMMERCIAL

## 2024-01-03 VITALS
WEIGHT: 233 LBS | HEART RATE: 73 BPM | OXYGEN SATURATION: 97 % | DIASTOLIC BLOOD PRESSURE: 74 MMHG | SYSTOLIC BLOOD PRESSURE: 122 MMHG | TEMPERATURE: 97.6 F | HEIGHT: 66 IN | BODY MASS INDEX: 37.45 KG/M2 | RESPIRATION RATE: 16 BRPM

## 2024-01-03 DIAGNOSIS — J45.40 MODERATE PERSISTENT ASTHMA, UNSPECIFIED WHETHER COMPLICATED: Primary | ICD-10-CM

## 2024-01-03 DIAGNOSIS — R49.0 HOARSENESS OF VOICE: ICD-10-CM

## 2024-01-03 DIAGNOSIS — J30.9 ALLERGIC RHINITIS, UNSPECIFIED SEASONALITY, UNSPECIFIED TRIGGER: ICD-10-CM

## 2024-01-03 DIAGNOSIS — R05.3 CHRONIC COUGH: ICD-10-CM

## 2024-01-03 PROCEDURE — 99214 OFFICE O/P EST MOD 30 MIN: CPT | Performed by: INTERNAL MEDICINE

## 2024-01-03 PROCEDURE — 3078F DIAST BP <80 MM HG: CPT | Performed by: INTERNAL MEDICINE

## 2024-01-03 PROCEDURE — 1123F ACP DISCUSS/DSCN MKR DOCD: CPT | Performed by: INTERNAL MEDICINE

## 2024-01-03 PROCEDURE — 3074F SYST BP LT 130 MM HG: CPT | Performed by: INTERNAL MEDICINE

## 2024-01-03 RX ORDER — FLUTICASONE PROPIONATE 50 MCG
1 SPRAY, SUSPENSION (ML) NASAL DAILY
Qty: 32 G | Refills: 11 | Status: SHIPPED | OUTPATIENT
Start: 2024-01-03

## 2024-01-03 RX ORDER — FLUTICASONE FUROATE AND VILANTEROL TRIFENATATE 100; 25 UG/1; UG/1
1 POWDER RESPIRATORY (INHALATION) DAILY
Qty: 1 EACH | Refills: 11 | Status: SHIPPED | OUTPATIENT
Start: 2024-01-03

## 2024-01-03 RX ORDER — CETIRIZINE HYDROCHLORIDE 10 MG/1
10 TABLET ORAL DAILY
Qty: 90 TABLET | Refills: 3 | Status: SHIPPED | OUTPATIENT
Start: 2024-01-03

## 2024-01-03 RX ORDER — MONTELUKAST SODIUM 10 MG/1
10 TABLET ORAL DAILY
Qty: 90 TABLET | Refills: 3 | Status: SHIPPED | OUTPATIENT
Start: 2024-01-03

## 2024-01-03 RX ORDER — AZELASTINE 1 MG/ML
1 SPRAY, METERED NASAL 2 TIMES DAILY
Qty: 60 ML | Refills: 11 | Status: SHIPPED | OUTPATIENT
Start: 2024-01-03

## 2024-01-03 NOTE — PATIENT INSTRUCTIONS
Think about [facility] [place] for Primary care:  [facility] [place]

## 2024-01-08 ENCOUNTER — NURSE TRIAGE (OUTPATIENT)
Dept: OTHER | Facility: CLINIC | Age: 74
End: 2024-01-08

## 2024-01-08 NOTE — TELEPHONE ENCOUNTER
Location of patient: [place]    Received call from Aba at [facility] with Red Flag Complaint.    Subjective: Caller states \"I have L shoulder pain.\"     Current Symptoms: Went to [facility] for the L shoulder pain and told it is a rotator cuff issues.  Some movements makes it worse.  No known injury.      Onset: several months ago; worsening    Associated Symptoms: reduced activity    Pain Severity: 10/10; sharp, shooting; waxing and waning, severe    Temperature: denies fever     What has been tried: Tylenol    LMP: NA Pregnant: NA    Recommended disposition: Go to Office Now/[facility] or Walk In as Backup    Care advice provided, patient verbalizes understanding; denies any other questions or concerns; instructed to call back for any new or worsening symptoms.    Patient/Caller agrees with recommended disposition; writer provided warm transfer to [facility] for appointment scheduling    Attention Provider:  Thank you for allowing me to participate in the care of your patient.  The patient was connected to triage in response to information provided to the [facility].  Please do not respond through this encounter as the response is not directed to a shared pool.    Reason for Disposition   SEVERE pain (e.g., excruciating, unable to do any normal activities)    Protocols used: Shoulder Pain-ADULT-[place]

## 2024-01-25 PROBLEM — J45.909 ASTHMA: Status: ACTIVE | Noted: 2024-01-25

## 2024-01-25 ASSESSMENT — ENCOUNTER SYMPTOMS: SHORTNESS OF BREATH: 0

## 2024-01-26 ENCOUNTER — OFFICE VISIT (OUTPATIENT)
Age: 74
End: 2024-01-26
Payer: COMMERCIAL

## 2024-01-26 ENCOUNTER — TELEPHONE (OUTPATIENT)
Dept: PULMONOLOGY | Age: 74
End: 2024-01-26

## 2024-01-26 VITALS
HEIGHT: 66 IN | SYSTOLIC BLOOD PRESSURE: 139 MMHG | HEART RATE: 69 BPM | DIASTOLIC BLOOD PRESSURE: 76 MMHG | WEIGHT: 237 LBS | BODY MASS INDEX: 38.09 KG/M2

## 2024-01-26 DIAGNOSIS — I49.3 PVC'S (PREMATURE VENTRICULAR CONTRACTIONS): ICD-10-CM

## 2024-01-26 DIAGNOSIS — J45.909 ASTHMA, UNSPECIFIED ASTHMA SEVERITY, UNSPECIFIED WHETHER COMPLICATED, UNSPECIFIED WHETHER PERSISTENT: ICD-10-CM

## 2024-01-26 DIAGNOSIS — R60.0 LOCALIZED EDEMA: ICD-10-CM

## 2024-01-26 DIAGNOSIS — I10 PRIMARY HYPERTENSION: ICD-10-CM

## 2024-01-26 DIAGNOSIS — I50.22 CHRONIC SYSTOLIC CONGESTIVE HEART FAILURE (HCC): Primary | ICD-10-CM

## 2024-01-26 PROCEDURE — 3078F DIAST BP <80 MM HG: CPT | Performed by: INTERNAL MEDICINE

## 2024-01-26 PROCEDURE — 1123F ACP DISCUSS/DSCN MKR DOCD: CPT | Performed by: INTERNAL MEDICINE

## 2024-01-26 PROCEDURE — 99214 OFFICE O/P EST MOD 30 MIN: CPT | Performed by: INTERNAL MEDICINE

## 2024-01-26 PROCEDURE — 3075F SYST BP GE 130 - 139MM HG: CPT | Performed by: INTERNAL MEDICINE

## 2024-01-26 PROCEDURE — 93000 ELECTROCARDIOGRAM COMPLETE: CPT | Performed by: INTERNAL MEDICINE

## 2024-01-26 NOTE — TELEPHONE ENCOUNTER
Per Dr. Penny call and see if she has been tested for the flu and if not needs to be tested.    Spoke to patient and asked if she had  been testing for the flu and she has not. Started to go over symptoms and she had to get off phone due to being at a doctors appointment and she said she will get tested for the flu then call the office back or send a message through my chart.

## 2024-01-26 NOTE — TELEPHONE ENCOUNTER
----- Message from Lubna Cabezas sent at 1/24/2024  7:29 AM EST -----  Regarding: Congestion  Contact: [phone]  Good morning. I have begun to cough up a yellowish green gunk. I don't seem to have any fever, however I have some rib area pain. I have had costochondritis in the past and this feels just like it.  Do you need to see me again or could you prescribe the antibiotic you prescribed last year? It worked really well.    Thank you for your time.    Lubna Cabezas

## 2024-02-02 DIAGNOSIS — I10 PRIMARY HYPERTENSION: ICD-10-CM

## 2024-02-02 DIAGNOSIS — I50.22 CHRONIC SYSTOLIC CONGESTIVE HEART FAILURE (HCC): ICD-10-CM

## 2024-02-02 RX ORDER — METOPROLOL SUCCINATE 100 MG/1
TABLET, EXTENDED RELEASE ORAL
Qty: 90 TABLET | Refills: 3 | Status: SHIPPED | OUTPATIENT
Start: 2024-02-02

## 2024-02-02 NOTE — TELEPHONE ENCOUNTER
Prescription sent to [facility] pharmacy//hunter  Requested Prescriptions     Signed Prescriptions Disp Refills    metoprolol succinate (TOPROL XL) 100 MG extended release tablet 90 tablet 3     Sig: TAKE 1 (ONE) TABLET BY MOUTH EVERY DAY     Authorizing Provider: JOSÉ MIGUEL DELUCA     Ordering User: IGOR STEVE

## 2024-02-23 SDOH — HEALTH STABILITY: PHYSICAL HEALTH: ON AVERAGE, HOW MANY MINUTES DO YOU ENGAGE IN EXERCISE AT THIS LEVEL?: 20 MIN

## 2024-02-23 SDOH — HEALTH STABILITY: PHYSICAL HEALTH: ON AVERAGE, HOW MANY DAYS PER WEEK DO YOU ENGAGE IN MODERATE TO STRENUOUS EXERCISE (LIKE A BRISK WALK)?: 5 DAYS

## 2024-02-26 ENCOUNTER — OFFICE VISIT (OUTPATIENT)
Dept: FAMILY MEDICINE CLINIC | Facility: CLINIC | Age: 74
End: 2024-02-26
Payer: COMMERCIAL

## 2024-02-26 VITALS
WEIGHT: 256 LBS | HEART RATE: 75 BPM | DIASTOLIC BLOOD PRESSURE: 70 MMHG | SYSTOLIC BLOOD PRESSURE: 126 MMHG | OXYGEN SATURATION: 95 % | BODY MASS INDEX: 41.14 KG/M2 | HEIGHT: 66 IN | TEMPERATURE: 97.3 F

## 2024-02-26 DIAGNOSIS — M75.42 ROTATOR CUFF IMPINGEMENT SYNDROME OF LEFT SHOULDER: ICD-10-CM

## 2024-02-26 DIAGNOSIS — J45.909 ASTHMA, UNSPECIFIED ASTHMA SEVERITY, UNSPECIFIED WHETHER COMPLICATED, UNSPECIFIED WHETHER PERSISTENT: ICD-10-CM

## 2024-02-26 DIAGNOSIS — I50.22 CHRONIC SYSTOLIC CONGESTIVE HEART FAILURE (HCC): ICD-10-CM

## 2024-02-26 DIAGNOSIS — K21.9 GASTROESOPHAGEAL REFLUX DISEASE WITHOUT ESOPHAGITIS: Primary | ICD-10-CM

## 2024-02-26 DIAGNOSIS — R60.0 LOCALIZED EDEMA: ICD-10-CM

## 2024-02-26 DIAGNOSIS — I10 PRIMARY HYPERTENSION: ICD-10-CM

## 2024-02-26 PROCEDURE — 99203 OFFICE O/P NEW LOW 30 MIN: CPT

## 2024-02-26 PROCEDURE — 1123F ACP DISCUSS/DSCN MKR DOCD: CPT

## 2024-02-26 PROCEDURE — 3074F SYST BP LT 130 MM HG: CPT

## 2024-02-26 PROCEDURE — 3078F DIAST BP <80 MM HG: CPT

## 2024-02-26 RX ORDER — OMEPRAZOLE 40 MG/1
40 CAPSULE, DELAYED RELEASE ORAL DAILY
Qty: 30 CAPSULE | Refills: 2 | Status: SHIPPED | OUTPATIENT
Start: 2024-02-26

## 2024-02-26 SDOH — ECONOMIC STABILITY: HOUSING INSECURITY
IN THE LAST 12 MONTHS, WAS THERE A TIME WHEN YOU DID NOT HAVE A STEADY PLACE TO SLEEP OR SLEPT IN A SHELTER (INCLUDING NOW)?: NO

## 2024-02-26 SDOH — ECONOMIC STABILITY: INCOME INSECURITY: HOW HARD IS IT FOR YOU TO PAY FOR THE VERY BASICS LIKE FOOD, HOUSING, MEDICAL CARE, AND HEATING?: NOT VERY HARD

## 2024-02-26 SDOH — ECONOMIC STABILITY: FOOD INSECURITY: WITHIN THE PAST 12 MONTHS, YOU WORRIED THAT YOUR FOOD WOULD RUN OUT BEFORE YOU GOT MONEY TO BUY MORE.: NEVER TRUE

## 2024-02-26 SDOH — ECONOMIC STABILITY: FOOD INSECURITY: WITHIN THE PAST 12 MONTHS, THE FOOD YOU BOUGHT JUST DIDN'T LAST AND YOU DIDN'T HAVE MONEY TO GET MORE.: NEVER TRUE

## 2024-02-26 ASSESSMENT — ENCOUNTER SYMPTOMS
COLOR CHANGE: 0
ABDOMINAL PAIN: 0
BLOOD IN STOOL: 0
COUGH: 0
RHINORRHEA: 0
APNEA: 0
VOMITING: 0
PHOTOPHOBIA: 0
SHORTNESS OF BREATH: 0
DIARRHEA: 0
NAUSEA: 0
EYE REDNESS: 0
CONSTIPATION: 0

## 2024-02-26 ASSESSMENT — PATIENT HEALTH QUESTIONNAIRE - PHQ9
SUM OF ALL RESPONSES TO PHQ QUESTIONS 1-9: 0
SUM OF ALL RESPONSES TO PHQ9 QUESTIONS 1 & 2: 0
1. LITTLE INTEREST OR PLEASURE IN DOING THINGS: 0
2. FEELING DOWN, DEPRESSED OR HOPELESS: 0
SUM OF ALL RESPONSES TO PHQ QUESTIONS 1-9: 0

## 2024-02-26 NOTE — ASSESSMENT & PLAN NOTE
Stable with omeprazole, she is requesting a refill. Practices lifestyle modifications in addition to taking omeprazole daily.

## 2024-02-26 NOTE — PROGRESS NOTES
Colon cancer screening: sept-oct. Colonoscopy/normal/5 year follow up    RSV: didn't receive it, not interested    Shingles: received it.     PNA: received it    Flu: didn't receive it, not interest.

## 2024-02-26 NOTE — ASSESSMENT & PLAN NOTE
Minimal pain on examination. Referral sent to PT close to her residence to help with movements. Pain with empty can test on examination and on palpation of R

## 2024-02-26 NOTE — ASSESSMENT & PLAN NOTE
Seeing cardiologist. Per previous provider note: \"Echo (1/10/24): Low normal EF. Normal diastolic function. Mild/moderate MR.LAE \"  Stable on metoprolol, lasix, spironolactone and losartan

## 2024-02-26 NOTE — PROGRESS NOTES
[facility]  _______________________________________  MD Lara Rivero, GEE Wu, MD Izabel Yi MD    [address] [place] [address]  Phone: [phone]  Fax: [phone]      Lubna Cabezas (: 1950) presents today c/o    Chief Complaint   Patient presents with    Other     Establishing care,  Left shoulder pain- hasn't had issues with it in the last couple of weeks.         Assessment/Plan:  Gastroesophageal reflux disease without esophagitis  Assessment & Plan:    Stable with omeprazole, she is requesting a refill. Practices lifestyle modifications in addition to taking omeprazole daily.     Orders:  -     omeprazole (PRILOSEC) 40 MG delayed release capsule; Take 1 capsule by mouth daily, Disp-30 capsule, R-2Normal  Rotator cuff impingement syndrome of left shoulder  Assessment & Plan:    Minimal pain on examination. Referral sent to PT close to her residence to help with movements. Pain with empty can test on examination and on palpation of R   Orders:  -     External Referral to Physical Therapy  Primary hypertension  Assessment & Plan:    Stable on losartan, metoprolol, lasix, spironoloactone. No chest pain or increased SOB  Asthma, unspecified asthma severity, unspecified whether complicated, unspecified whether persistent  Assessment & Plan:  Seeing pulmonologist. Stable on Breo inhaler, albuterol, and Singulair regimen. No adventitious lung sounds on examination today.   Chronic systolic congestive heart failure (HCC)  Assessment & Plan:    Seeing cardiologist. Per previous provider note: \"Echo (1/10/24): Low normal EF. Normal diastolic function. Mild/moderate MR.LAE \"  Stable on metoprolol, lasix, spironolactone and losartan  Localized edema  Assessment & Plan:           Follow up in two months for Medicare Wellness and Labs      HPI  New patient to establish care.     Specialsits: pulmonology (saw in  for

## 2024-04-09 ENCOUNTER — PATIENT MESSAGE (OUTPATIENT)
Age: 74
End: 2024-04-09

## 2024-04-10 NOTE — TELEPHONE ENCOUNTER
Joey Church MD   to Me       4/10/24  9:54 AM  Needs appt.        Appt 05/31/2024 at 11:15 am MA  Pt v/u

## 2024-04-10 NOTE — TELEPHONE ENCOUNTER
Shruthi Farris MA 4/9/2024 1:43 PM EDT      ----- Message -----  From: Lubna Cabezas  Sent: 4/9/2024 1:41 PM EDT  To: [facility] Cardiology Clinical Staff  Subject: Heart flutters     I have costochondritis pretty bad and I have been having some frequent heart flutters and weakness. Last night it was more of a pounding than flutter. Should I come in for a check up?      Lubna Cabezas   [phone]

## 2024-04-10 NOTE — TELEPHONE ENCOUNTER
Pt called c/o  intermitted fluttering sensation     Onset:4/6/2024.    Duration: few mins    Triggers: position changes or fast movement    SX:slight vertigo,heaviness in chest    BP/HR: not recorded     Meds: losartan 50mg, Metoprolol succinate 100mg, spironolactone 25mg     Last ov 1/26/2024

## 2024-04-16 ENCOUNTER — TELEPHONE (OUTPATIENT)
Age: 74
End: 2024-04-16

## 2024-04-16 NOTE — TELEPHONE ENCOUNTER
Patient called with blood pressure readings. Has some swelling in left foot and ankle that goes away after elevating legs when sitting and at night, has a lot of fatigue, heart still flutters several times a day, has chest heaviness once to twice a day. Drinks 2-3 bottles water 2-3 glasses of tea  Patient confirmed taking lasix 40 mg as needed, losartan 50 mg daily, Toprol 100 mg daily, spironolactone 25 mg daily.    From: Lubna Cabezas  Sent: 4/15/2024   8:59 PM EDT  To: [facility] Cardiology Clinical Staff  Subject: Heart flutters                                   You asked me about my blood pressure readings. These are what I have for the last few days.  Friday through Sunday:    116/87 and 69     96/59 and 69  116/80 and 90    96/82 and 79  111/82 and 79    96/68 and 76  124/82 and 81    99/78 and 99    86/71 and 40  102/73 and 65  These below are after sweeping and mopping.  106/86 and 102    96/81 and 101    87/74 and 112  After a short walk   120/75 and 115  105/80 and 81    Monday 4/15  119/85 and 89  104/60 and 102    Patient informed that this note will be sent to Dr. Church, call back with doctors response//hunter  Last office visit 1-26-24 next office visit 5-31-24  Echo 1-26-24

## 2024-04-16 NOTE — ASSESSMENT & PLAN NOTE
Stable on losartan, metoprolol, lasix, spironoloactone. No chest pain or increased SOB   4/16/2024 Update from preservice:  Cosentyx Sensoready (300 MG) 150MG/ML auto-injectors -  Approved - Awaiting patient call back to confirm pharmacy      Authorization Number: [id number]   Valid from 04/12/2024 to 10/12/2024      Will notify clinic when script can be sent/ has been released to pharmacy   Must fill through [employer]      Prescription Insurance: [employer]   Type of Coverage: Medicare Part D   Patient's Co-Pay: $0.00 per test claim       Co pay Assistance Information                   Assistance not required.       Additional Information:   Left message: to notify if approval and filling pharmacy.      Message sent to provider.                  4/16/24

## 2024-04-17 NOTE — TELEPHONE ENCOUNTER
Place a 2-week Holter.  I can see her after this.   Patient called with Dr. Church's response. Voiced understanding. Scheduled for monitor placement 4-18-24 EA//hunter

## 2024-04-18 DIAGNOSIS — R00.0 TACHYCARDIA: Primary | ICD-10-CM

## 2024-04-25 ENCOUNTER — OFFICE VISIT (OUTPATIENT)
Dept: FAMILY MEDICINE CLINIC | Facility: CLINIC | Age: 74
End: 2024-04-25

## 2024-04-25 VITALS
BODY MASS INDEX: 38.15 KG/M2 | OXYGEN SATURATION: 90 % | WEIGHT: 237.4 LBS | HEIGHT: 66 IN | DIASTOLIC BLOOD PRESSURE: 74 MMHG | SYSTOLIC BLOOD PRESSURE: 113 MMHG | HEART RATE: 76 BPM | TEMPERATURE: 96 F

## 2024-04-25 DIAGNOSIS — I50.22 CHRONIC SYSTOLIC CONGESTIVE HEART FAILURE (HCC): ICD-10-CM

## 2024-04-25 DIAGNOSIS — I10 PRIMARY HYPERTENSION: ICD-10-CM

## 2024-04-25 DIAGNOSIS — Z23 NEED FOR 23-POLYVALENT PNEUMOCOCCAL POLYSACCHARIDE VACCINE: ICD-10-CM

## 2024-04-25 DIAGNOSIS — Z00.00 ROUTINE GENERAL MEDICAL EXAMINATION AT A HEALTH CARE FACILITY: ICD-10-CM

## 2024-04-25 DIAGNOSIS — Z00.00 MEDICARE ANNUAL WELLNESS VISIT, SUBSEQUENT: Primary | ICD-10-CM

## 2024-04-25 DIAGNOSIS — J45.41 MODERATE PERSISTENT ASTHMA WITH ACUTE EXACERBATION: ICD-10-CM

## 2024-04-25 LAB
ALBUMIN SERPL-MCNC: 4 G/DL (ref 3.2–4.6)
ALBUMIN/GLOB SERPL: 1.1 (ref 1–1.9)
ALP SERPL-CCNC: 82 U/L (ref 35–104)
ALT SERPL-CCNC: 19 U/L (ref 12–65)
ANION GAP SERPL CALC-SCNC: 12 MMOL/L (ref 9–18)
AST SERPL-CCNC: 27 U/L (ref 15–37)
BASOPHILS # BLD: 0 K/UL (ref 0–0.2)
BASOPHILS NFR BLD: 1 % (ref 0–2)
BILIRUB SERPL-MCNC: 0.3 MG/DL (ref 0–1.2)
BUN SERPL-MCNC: 11 MG/DL (ref 8–23)
CALCIUM SERPL-MCNC: 9.5 MG/DL (ref 8.8–10.2)
CHLORIDE SERPL-SCNC: 104 MMOL/L (ref 98–107)
CHOLEST SERPL-MCNC: 186 MG/DL (ref 0–200)
CO2 SERPL-SCNC: 24 MMOL/L (ref 20–28)
CREAT SERPL-MCNC: 0.91 MG/DL (ref 0.6–1.1)
DIFFERENTIAL METHOD BLD: ABNORMAL
EOSINOPHIL # BLD: 0.2 K/UL (ref 0–0.8)
EOSINOPHIL NFR BLD: 3 % (ref 0.5–7.8)
ERYTHROCYTE [DISTWIDTH] IN BLOOD BY AUTOMATED COUNT: 15.3 % (ref 11.9–14.6)
GLOBULIN SER CALC-MCNC: 3.6 G/DL (ref 2.3–3.5)
GLUCOSE SERPL-MCNC: 96 MG/DL (ref 70–99)
HCT VFR BLD AUTO: 36.7 % (ref 35.8–46.3)
HDLC SERPL-MCNC: 33 MG/DL (ref 40–60)
HDLC SERPL: 5.6 (ref 0–5)
HGB BLD-MCNC: 11 G/DL (ref 11.7–15.4)
IMM GRANULOCYTES # BLD AUTO: 0 K/UL (ref 0–0.5)
IMM GRANULOCYTES NFR BLD AUTO: 0 % (ref 0–5)
LDLC SERPL CALC-MCNC: 112 MG/DL (ref 0–100)
LYMPHOCYTES # BLD: 2.2 K/UL (ref 0.5–4.6)
LYMPHOCYTES NFR BLD: 29 % (ref 13–44)
MCH RBC QN AUTO: 28.1 PG (ref 26.1–32.9)
MCHC RBC AUTO-ENTMCNC: 30 G/DL (ref 31.4–35)
MCV RBC AUTO: 93.6 FL (ref 82–102)
MONOCYTES # BLD: 0.8 K/UL (ref 0.1–1.3)
MONOCYTES NFR BLD: 10 % (ref 4–12)
NEUTS SEG # BLD: 4.3 K/UL (ref 1.7–8.2)
NEUTS SEG NFR BLD: 57 % (ref 43–78)
NRBC # BLD: 0 K/UL (ref 0–0.2)
PLATELET # BLD AUTO: 268 K/UL (ref 150–450)
PMV BLD AUTO: 11.2 FL (ref 9.4–12.3)
POTASSIUM SERPL-SCNC: 4.4 MMOL/L (ref 3.5–5.1)
PROT SERPL-MCNC: 7.6 G/DL (ref 6.3–8.2)
RBC # BLD AUTO: 3.92 M/UL (ref 4.05–5.2)
SODIUM SERPL-SCNC: 139 MMOL/L (ref 136–145)
TRIGL SERPL-MCNC: 201 MG/DL (ref 0–150)
TSH, 3RD GENERATION: 1.69 UIU/ML (ref 0.27–4.2)
VLDLC SERPL CALC-MCNC: 40 MG/DL (ref 6–23)
WBC # BLD AUTO: 7.6 K/UL (ref 4.3–11.1)

## 2024-04-25 RX ORDER — PREDNISONE 5 MG/1
TABLET ORAL
Qty: 1 EACH | Refills: 0 | Status: SHIPPED | OUTPATIENT
Start: 2024-04-25

## 2024-04-25 ASSESSMENT — LIFESTYLE VARIABLES
HOW MANY STANDARD DRINKS CONTAINING ALCOHOL DO YOU HAVE ON A TYPICAL DAY: PATIENT DOES NOT DRINK
HOW OFTEN DO YOU HAVE A DRINK CONTAINING ALCOHOL: NEVER

## 2024-04-25 ASSESSMENT — PATIENT HEALTH QUESTIONNAIRE - PHQ9
1. LITTLE INTEREST OR PLEASURE IN DOING THINGS: NOT AT ALL
SUM OF ALL RESPONSES TO PHQ QUESTIONS 1-9: 0
SUM OF ALL RESPONSES TO PHQ9 QUESTIONS 1 & 2: 0
SUM OF ALL RESPONSES TO PHQ QUESTIONS 1-9: 0
2. FEELING DOWN, DEPRESSED OR HOPELESS: NOT AT ALL
SUM OF ALL RESPONSES TO PHQ QUESTIONS 1-9: 0
SUM OF ALL RESPONSES TO PHQ QUESTIONS 1-9: 0

## 2024-04-25 NOTE — ASSESSMENT & PLAN NOTE
Wheezing heard on examination. Sees pulmonologist Dr. Penny annually.   On Breo inhaler, albuterol, and Singulair regimen. Wheezing auscultated. Encouraged to use albuterol inhaler every 4-6 hours as needed for wheezing, prescribed prednisone dose pack to also help. The rib pain may be due to increased wheezing/cough. Pulse Ox 90% (lower than her normal). Informed to go to ED for worsening symptoms.

## 2024-04-25 NOTE — ADDENDUM NOTE
[address] [address]  Outpatient Physical Therapy    Treatment Note        Date: 2021  Patient: Medardo Hunter  : 1992  ACCT #: [de-identified]  Referring Practitioner: Norman Izaguirre  Diagnosis: ACL tear, MMT    Visit Information:  PT Visit Information  Onset Date: 10/02/20  PT Insurance Information: [address]  Total # of Visits Approved: 20  Total # of Visits to Date: 19  No Show: 0  Canceled Appointment: 2  Progress Note Counter:  (HARD MAX at 20 visits)    Subjective: Pt arrives w/ new script from MD recommending US and \"deep tissue\" for patellar tendonitis. Pt states he follows up again w/ MD in 2-3 months. Pt states he was sore after LV that lasted for about a day. Comments: RTD 2-3 months  HEP Compliance:  [x] Good [] Fair [] Poor [] Reports not doing due to:    Vital Signs  Patient Currently in Pain: Denies   Pain Screening  Patient Currently in Pain: Denies    OBJECTIVE:   Exercises  Exercise 1: Octane bike in gym level 11 x5 mins  Exercise 2: stiff leg RDL w/ cane placed behind back for alignment x10  Exercise 3: leg press in gym 2x10 @ 120#, 2x10 @ 140#  Exercise 4: goblet squat variation w/ DB low 25# x10  Exercise 12: HS curl in gym 2x10 50#, 1x10 60#  Exercise 13: leg ext in gym 3x10 @ 40#    Strength: [x] NT  [] MMT completed:     ROM: [x] NT  [] ROM measurements:     Manual:   Manual therapy  Other: discussed MFD cupping techniques w/ pt to consider trialing for patellar tendonitis, specifically MFD cupping during gait cycle for pain during knee ext; pt agreeable to trial NV    Modalities:  Modalities  Ultrasound: US to patellar tendon w/ and w/o patellar tendon mobs x6 mins +2 for setup/clean up, 50%, 3mghz, 1.0w/cm2     *Indicates exercise, modality, or manual techniques to be initiated when appropriate    Assessment:    Body structures, Functions, Activity limitations: Decreased functional mobility , Decreased ADL status, Decreased ROM, Decreased strength, Decreased balance, Addended by: SURESH IZAGUIRRE on: 4/25/2024 02:25 PM     Modules accepted: Orders     Increased pain, Decreased high-level IADLs, Decreased endurance  Assessment: Cont to progress therex w/ inc in resistance in gym ex's and addition of stiff leg RDL w/ emphasis on HS/glute contraction. Also initiated US this date per script after discussion w/ Sofía Rogers PT, DPT. Pt tolerating all well w/o significant c/o pain. Pt did report inc HS soreness after RDL's. Treatment Diagnosis: decreased R knee AROM, decreased R LE strength, impaired gait and functional mobility, and decreased functional activity tolerance  Prognosis: Excellent     Goals:  Short term goals  Time Frame for Short term goals: 4- 5 weeks  Short term goal 1: The pt will report decreased R knee pain </= 2/10 at worst to increase ease with ADL's  Short term goal 2: The patient will ambulate >/=350 ft independently without AD in order to safely ambulate in the community  Long term goals  Time Frame for Long term goals : 10 weeks  Long term goal 1: The pt will demo improved R LE strength 5/5 in order to safely ambulate unlimited distances without AD indep at PLOF  Long term goal 2: The pt will demo improved R knee AROM >/=0-130* in order to perform stairs recip indep at PLOF  Long term goal 3: The pt will have improvement in LEFS score >/=70/80 in order to return to PLOF(updated 11/13/20)  Long term goal 4: The pt will demo improved R SLS >/=30 seconds to increase ease with ambulation  Long term goal 5: The pt will be indep/compliant with HEP in order to self manage symptoms upon D/C  Progress toward goals: inc strength, rom, dec pain    POST-PAIN       Pain Rating (0-10 pain scale):   \"muscle soreness\"/10   Location and pain description same as pre-treatment unless indicated.    Action: [] NA   [x] Perform HEP  [] Meds as prescribed  [] Modalities as prescribed   [] Call Physician     Frequency/Duration:  Plan  Times per week: 1-2  Plan weeks: 10  Specific instructions for Next Treatment: PN NV to extend POC; consider MFD cuppingduring gait

## 2024-04-25 NOTE — PATIENT INSTRUCTIONS
Learning About Being Active as an Older Adult  Why is being active important as you get older?     Being active is one of the best things you can do for your health. And it's never too late to start. Being active--or getting active, if you aren't already--has definite benefits. It can:  Give you more energy,  Keep your mind sharp.  Improve balance to reduce your risk of falls.  Help you manage chronic illness with fewer medicines.  No matter how old you are, how fit you are, or what health problems you have, there is a form of activity that will work for you. And the more physical activity you can do, the better your overall health will be.  What kinds of activity can help you stay healthy?  Being more active will make your daily activities easier. Physical activity includes planned exercise and things you do in daily life. There are four types of activity:  Aerobic.  Doing aerobic activity makes your heart and lungs strong.  Includes walking, dancing, and gardening.  Aim for at least 2½ hours spread throughout the week.  It improves your energy and can help you sleep better.  Muscle-strengthening.  This type of activity can help maintain muscle and strengthen bones.  Includes climbing stairs, using resistance bands, and lifting or carrying heavy loads.  Aim for at least twice a week.  It can help protect the knees and other joints.  Stretching.  Stretching gives you better range of motion in joints and muscles.  Includes upper arm stretches, calf stretches, and gentle yoga.  Aim for at least twice a week, preferably after your muscles are warmed up from other activities.  It can help you function better in daily life.  Balancing.  This helps you stay coordinated and have good posture.  Includes heel-to-toe walking, ashley chi, and certain types of yoga.  Aim for at least 3 days a week.  It can reduce your risk of falling.  Even if you have a hard time meeting the recommendations, it's better to be more active

## 2024-04-25 NOTE — PROGRESS NOTES
[facility] (Including outside providers/suppliers regularly involved in providing care):   Patient Care Team:  Shawna Wu APRN - NP as PCP - General (Family Medicine)  Shawna Wu APRN - NP as PCP - Empaneled Provider  Joey Church MD as Consulting Physician (Cardiology)     Reviewed and updated this visit:  Tobacco  Allergies  Meds  Problems  Med Hx  Surg Hx  Soc Hx  Fam Hx

## 2024-04-27 DIAGNOSIS — I10 PRIMARY HYPERTENSION: ICD-10-CM

## 2024-04-29 RX ORDER — LOSARTAN POTASSIUM 50 MG/1
50 TABLET ORAL DAILY
Qty: 90 TABLET | Refills: 3 | Status: SHIPPED | OUTPATIENT
Start: 2024-04-29

## 2024-04-29 NOTE — TELEPHONE ENCOUNTER
Prescription sent to pharmacy//rashaadab  Requested Prescriptions     Signed Prescriptions Disp Refills    losartan (COZAAR) 50 MG tablet 90 tablet 3     Sig: Take 1 tablet by mouth daily     Authorizing Provider: JOSÉ MIGUEL DELUCA     Ordering User: IGOR STEVE

## 2024-05-21 ENCOUNTER — OFFICE VISIT (OUTPATIENT)
Dept: FAMILY MEDICINE CLINIC | Facility: CLINIC | Age: 74
End: 2024-05-21
Payer: COMMERCIAL

## 2024-05-21 VITALS
DIASTOLIC BLOOD PRESSURE: 67 MMHG | WEIGHT: 236.2 LBS | HEIGHT: 66 IN | HEART RATE: 83 BPM | BODY MASS INDEX: 37.96 KG/M2 | TEMPERATURE: 96.8 F | SYSTOLIC BLOOD PRESSURE: 111 MMHG | OXYGEN SATURATION: 94 %

## 2024-05-21 DIAGNOSIS — Z29.11 NEED FOR RSV VACCINATION: ICD-10-CM

## 2024-05-21 DIAGNOSIS — K21.9 GASTROESOPHAGEAL REFLUX DISEASE WITHOUT ESOPHAGITIS: ICD-10-CM

## 2024-05-21 DIAGNOSIS — I49.3 PVC'S (PREMATURE VENTRICULAR CONTRACTIONS): ICD-10-CM

## 2024-05-21 DIAGNOSIS — B96.89 ACUTE BACTERIAL SINUSITIS: Primary | ICD-10-CM

## 2024-05-21 DIAGNOSIS — H66.92 LEFT ACUTE OTITIS MEDIA: ICD-10-CM

## 2024-05-21 DIAGNOSIS — D64.9 ANEMIA, UNSPECIFIED TYPE: ICD-10-CM

## 2024-05-21 DIAGNOSIS — I10 PRIMARY HYPERTENSION: ICD-10-CM

## 2024-05-21 DIAGNOSIS — Z23 NEED FOR SHINGLES VACCINE: ICD-10-CM

## 2024-05-21 DIAGNOSIS — J45.909 ASTHMA, UNSPECIFIED ASTHMA SEVERITY, UNSPECIFIED WHETHER COMPLICATED, UNSPECIFIED WHETHER PERSISTENT: ICD-10-CM

## 2024-05-21 DIAGNOSIS — I50.22 CHRONIC SYSTOLIC CONGESTIVE HEART FAILURE (HCC): ICD-10-CM

## 2024-05-21 DIAGNOSIS — J01.90 ACUTE BACTERIAL SINUSITIS: Primary | ICD-10-CM

## 2024-05-21 DIAGNOSIS — E78.2 MIXED HYPERLIPIDEMIA: ICD-10-CM

## 2024-05-21 LAB
FERRITIN SERPL-MCNC: 109 NG/ML (ref 8–388)
IRON SATN MFR SERPL: 14 % (ref 20–50)
IRON SERPL-MCNC: 60 UG/DL (ref 35–100)
TIBC SERPL-MCNC: 428 UG/DL (ref 240–450)
UIBC SERPL-MCNC: 368 UG/DL (ref 112–347)

## 2024-05-21 PROCEDURE — 3074F SYST BP LT 130 MM HG: CPT

## 2024-05-21 PROCEDURE — 99214 OFFICE O/P EST MOD 30 MIN: CPT

## 2024-05-21 PROCEDURE — 3078F DIAST BP <80 MM HG: CPT

## 2024-05-21 PROCEDURE — 1123F ACP DISCUSS/DSCN MKR DOCD: CPT

## 2024-05-21 RX ORDER — DOXYCYCLINE HYCLATE 100 MG
100 TABLET ORAL 2 TIMES DAILY
Qty: 20 TABLET | Refills: 0 | Status: SHIPPED | OUTPATIENT
Start: 2024-05-21 | End: 2024-05-31

## 2024-05-21 RX ORDER — ZOSTER VACCINE RECOMBINANT, ADJUVANTED 50 MCG/0.5
0.5 KIT INTRAMUSCULAR SEE ADMIN INSTRUCTIONS
Qty: 0.5 ML | Refills: 0 | Status: SHIPPED | OUTPATIENT
Start: 2024-05-21 | End: 2024-11-17

## 2024-05-21 RX ORDER — OMEPRAZOLE 40 MG/1
40 CAPSULE, DELAYED RELEASE ORAL DAILY
Qty: 90 CAPSULE | Refills: 1 | Status: SHIPPED | OUTPATIENT
Start: 2024-05-21

## 2024-05-21 ASSESSMENT — ENCOUNTER SYMPTOMS
RHINORRHEA: 1
SORE THROAT: 1
COUGH: 1
WHEEZING: 0
SINUS PRESSURE: 1
SHORTNESS OF BREATH: 0
SINUS PAIN: 1

## 2024-05-21 NOTE — PROGRESS NOTES
[facility]  _______________________________________  MD Lara Rivero, GEE Wu, MD Izabel Yi MD    [address] [place] [id number]  Phone: [phone]  Fax: [phone]      Lubna Cabezas (: 1950) presents today c/o    Chief Complaint   Patient presents with    Other     Follow up  Left Ear concerns (can hardly hear)  Congestion( has been going on for about a week, cough         Assessment/Plan:  Acute bacterial sinusitis  Assessment & Plan:    Informed to take doxycycline with food to prevent nausea and take a probiotic daily. Wear sunscreen when going outside as it can increase sensitivity to the sun.   Orders:  -     doxycycline hyclate (VIBRA-TABS) 100 MG tablet; Take 1 tablet by mouth 2 times daily for 10 days, Disp-20 tablet, R-0Normal  Gastroesophageal reflux disease without esophagitis  Assessment & Plan:      Stable with omeprazole, she is requesting a refill. Practices lifestyle modifications in addition to taking omeprazole daily.   Orders:  -     omeprazole (PRILOSEC) 40 MG delayed release capsule; Take 1 capsule by mouth daily, Disp-90 capsule, R-1Normal  Left acute otitis media  Assessment & Plan:    Bulging, erythematous. Prescribed doxycycline to take. Informed to take a probiotic daily and to wear sunscreen if outside and take with food to prevent nausea  Orders:  -     doxycycline hyclate (VIBRA-TABS) 100 MG tablet; Take 1 tablet by mouth 2 times daily for 10 days, Disp-20 tablet, R-0Normal  Anemia, unspecified type  Assessment & Plan:    Checking ferritin, TIBC, and iron. Patient states she has had iron deficiency anemia in the past. Asymptomatic today.   Orders:  -     Ferritin; Future  -     Iron and TIBC; Future  Need for RSV vaccination  Assessment & Plan:    Order placed, informed of recommendations and potential side effects of vaccination.   Orders:  -     respiratory syncytial vaccine,

## 2024-05-21 NOTE — ASSESSMENT & PLAN NOTE
Stable. No wheezing heard on examination, lungs sound much better than last encounter. Sees pulmonologist Dr. Penny annually. ON Breo inhaler, albuterol, singulair.

## 2024-05-21 NOTE — ASSESSMENT & PLAN NOTE
Bulging, erythematous. Prescribed doxycycline to take. Informed to take a probiotic daily and to wear sunscreen if outside and take with food to prevent nausea

## 2024-05-21 NOTE — ASSESSMENT & PLAN NOTE
Informed to take doxycycline with food to prevent nausea and take a probiotic daily. Wear sunscreen when going outside as it can increase sensitivity to the sun.

## 2024-05-21 NOTE — ASSESSMENT & PLAN NOTE
Following up with cardiologist next week, currently stable. Last echo in 2024: Low normal EF. Normal diastolic function. Mild/moderate MR.LAE.

## 2024-05-21 NOTE — ASSESSMENT & PLAN NOTE
Checking ferritin, TIBC, and iron. Patient states she has had iron deficiency anemia in the past. Asymptomatic today.

## 2024-05-21 NOTE — ASSESSMENT & PLAN NOTE
HR irregular on examination today. She is following up with cardiology next week. She denies chest pain, shortness of breath.

## 2024-05-31 ENCOUNTER — NURSE ONLY (OUTPATIENT)
Age: 74
End: 2024-05-31
Payer: COMMERCIAL

## 2024-05-31 VITALS — HEART RATE: 107 BPM

## 2024-05-31 DIAGNOSIS — R00.0 TACHYCARDIA: Primary | ICD-10-CM

## 2024-05-31 PROCEDURE — 93000 ELECTROCARDIOGRAM COMPLETE: CPT | Performed by: INTERNAL MEDICINE

## 2024-05-31 NOTE — PROGRESS NOTES
I verbally spoke to Dr. Church's nurse Shara GRIFFITHS and she informed me that pt is in AFIB and Dr. Church wants to physically see pt in the office to go over plan and that there's an appt available next Friday 6/7/24. The patient did state that she will be out of town next week mon-fri. Etta and I went into the room to discuss with the pt the importance of keeping this appt for next Fri, including the risks of Afib. The patient agrees on coming in and I confirmed it was scheduled in the [facility] office.

## 2024-05-31 NOTE — PROGRESS NOTES
Pt's HR is at 107, medications reviewed and no changes. Denies CP. Pt just had a monitor placed and showed afib with rvr and pt is currently not on a blood thinner.

## 2024-06-06 ASSESSMENT — ENCOUNTER SYMPTOMS: SHORTNESS OF BREATH: 0

## 2024-06-07 ENCOUNTER — OFFICE VISIT (OUTPATIENT)
Dept: FAMILY MEDICINE CLINIC | Facility: CLINIC | Age: 74
End: 2024-06-07

## 2024-06-07 ENCOUNTER — OFFICE VISIT (OUTPATIENT)
Age: 74
End: 2024-06-07
Payer: COMMERCIAL

## 2024-06-07 VITALS
WEIGHT: 239.8 LBS | SYSTOLIC BLOOD PRESSURE: 122 MMHG | HEART RATE: 89 BPM | HEIGHT: 66 IN | DIASTOLIC BLOOD PRESSURE: 70 MMHG | BODY MASS INDEX: 38.54 KG/M2

## 2024-06-07 VITALS
HEART RATE: 78 BPM | WEIGHT: 239 LBS | DIASTOLIC BLOOD PRESSURE: 77 MMHG | SYSTOLIC BLOOD PRESSURE: 136 MMHG | HEIGHT: 66 IN | TEMPERATURE: 96.4 F | OXYGEN SATURATION: 97 % | BODY MASS INDEX: 38.41 KG/M2

## 2024-06-07 DIAGNOSIS — I48.0 PAROXYSMAL ATRIAL FIBRILLATION (HCC): Primary | ICD-10-CM

## 2024-06-07 DIAGNOSIS — J01.90 ACUTE BACTERIAL SINUSITIS: Primary | ICD-10-CM

## 2024-06-07 DIAGNOSIS — I10 PRIMARY HYPERTENSION: ICD-10-CM

## 2024-06-07 DIAGNOSIS — I50.22 CHRONIC SYSTOLIC CONGESTIVE HEART FAILURE (HCC): ICD-10-CM

## 2024-06-07 DIAGNOSIS — B96.89 ACUTE BACTERIAL SINUSITIS: Primary | ICD-10-CM

## 2024-06-07 PROCEDURE — 99214 OFFICE O/P EST MOD 30 MIN: CPT | Performed by: INTERNAL MEDICINE

## 2024-06-07 PROCEDURE — 93000 ELECTROCARDIOGRAM COMPLETE: CPT | Performed by: INTERNAL MEDICINE

## 2024-06-07 PROCEDURE — 1123F ACP DISCUSS/DSCN MKR DOCD: CPT | Performed by: INTERNAL MEDICINE

## 2024-06-07 PROCEDURE — 3078F DIAST BP <80 MM HG: CPT | Performed by: INTERNAL MEDICINE

## 2024-06-07 PROCEDURE — 3074F SYST BP LT 130 MM HG: CPT | Performed by: INTERNAL MEDICINE

## 2024-06-07 RX ORDER — METHYLPREDNISOLONE ACETATE 80 MG/ML
40 INJECTION, SUSPENSION INTRA-ARTICULAR; INTRALESIONAL; INTRAMUSCULAR; SOFT TISSUE ONCE
Status: COMPLETED | OUTPATIENT
Start: 2024-06-07 | End: 2024-06-07

## 2024-06-07 RX ORDER — POTASSIUM CHLORIDE 750 MG/1
10 TABLET, FILM COATED, EXTENDED RELEASE ORAL DAILY
Qty: 90 TABLET | Refills: 3 | Status: SHIPPED | OUTPATIENT
Start: 2024-06-07

## 2024-06-07 RX ORDER — GUAIFENESIN 600 MG/1
600 TABLET, EXTENDED RELEASE ORAL 2 TIMES DAILY
Qty: 20 TABLET | Refills: 0 | Status: SHIPPED | OUTPATIENT
Start: 2024-06-07 | End: 2024-06-17

## 2024-06-07 RX ORDER — LEVOFLOXACIN 500 MG/1
500 TABLET, FILM COATED ORAL DAILY
Qty: 10 TABLET | Refills: 0 | Status: SHIPPED | OUTPATIENT
Start: 2024-06-07 | End: 2024-06-17

## 2024-06-07 RX ADMIN — METHYLPREDNISOLONE ACETATE 40 MG: 80 INJECTION, SUSPENSION INTRA-ARTICULAR; INTRALESIONAL; INTRAMUSCULAR; SOFT TISSUE at 10:39

## 2024-06-07 NOTE — PROGRESS NOTES
[facility]  _______________________________________  MD Lara Rivero, GEE Wu, MD Izabel Yi MD    [address] [place] [id number]  Phone: [phone]  Fax: [phone]      Lubna Cabezas (: 1950) presents today c/o    Chief Complaint   Patient presents with    Other     Congestion, both ears are stopped up- has been going on for the past couple of weeks         Assessment/Plan:  Acute bacterial sinusitis  -     levoFLOXacin (LEVAQUIN) 500 MG tablet; Take 1 tablet by mouth daily for 10 days, Disp-10 tablet, R-0Normal  -     methylPREDNISolone acetate (DEPO-MEDROL) injection 40 mg; 40 mg, IntraMUSCular, ONCE, 1 dose, On 24 at 1045  -     guaiFENesin (MUCINEX) 600 MG extended release tablet; Take 1 tablet by mouth 2 times daily for 10 days, Disp-20 tablet, R-0Normal  Gave steroid injection as increased tension/sinus pressure and has been going on for over four weeks. Encouraged to take mucinex and decongestant OTC. Prescribed levofloxacin- informed her of risks associated with fluoroquinolones, patient understands and has tolerated in the past.     Informed patient if symptoms do not resolve or worsen in the next 3-5 days, to come in to see us.       HPI  Patient presents with congestion, both ears stopped up. This has been going on for a couple of weeks. I did see her on  and gave her doxycycline for a bacterial sinus infection. She states symptoms have not changed since taking doxycycline. Now she is complaining of worsening neck pain as well.       She does tolerate fluoroquinolones. She denies wheezing, but states that it is taking more effort for her to breathe due to the congestion.     Patient Active Problem List   Diagnosis    Tachycardia    Chronic systolic congestive heart failure (HCC)    PVC's (premature ventricular contractions)    Primary hypertension    Severe obesity (HCC)    Localized edema

## 2024-06-07 NOTE — PROGRESS NOTES
12 Lead    2. Chronic systolic congestive heart failure (HCC)  Recovered LV function based on more recent echo.  Last echo January 2024 with EF 50-55%..  Reevaluate LV function with upcoming MANDO.    3. Paroxysmal atrial fibrillation (HCC)  Plan MANDO cardioversion.  Continue beta-blocker.  Started Eliquis.  Will see her back after the cardioversion in 2 weeks to discuss consideration for antiarrhythmic therapy or referral for A-fib ablation.  - MANDO with possible cardioversion (PRN contrast/bubble/3D); Future             Return in about 2 weeks (around 6/21/2024).       Joey Church MD  6/7/2024  2:57 PM    *Portions of the record have been created with voice recognition software. Occasional wrong-word or 'sound-a-like' substitutions may have occurred due to the inherent limitations of voice recognition software. Read the chart carefully and recognize, using context, where substitutions have occurred.

## 2024-06-07 NOTE — PROGRESS NOTES
PLEASE CONTINUE TAKING ALL PRESCRIPTION MEDICATIONS UP TO THE DAY OF SURGERY UNLESS OTHERWISE DIRECTED BELOW. DISCONTINUE all vitamins and supplements 7 days prior to surgery. DISCONTINUE Non-Steriodal Anti-Inflammatory (NSAIDS) such as Advil and Aleve 5 days prior to surgery. Home Medications to take  the day of surgery    Breo, zyrtec, astelin, flonase, toprol, simgulair, omeprazole and bring albuterol inhaler           Home Medications   to Hold   none        Comments      On the day before surgery please take Acetaminophen 1000mg in the morning and then again before bed. You may substitute for Tylenol 650 mg. Please do not bring home medications with you on the day of surgery unless otherwise directed by your nurse. If you are instructed to bring home medications, please give them to your nurse as they will be administered by the nursing staff. If you have any questions, please call [address] [facility] [phone]. A copy of this note was provided to the patient for reference. right ear pain/sore throat

## 2024-06-10 ENCOUNTER — HOSPITAL ENCOUNTER (OUTPATIENT)
Dept: CARDIAC CATH/INVASIVE PROCEDURES | Age: 74
Discharge: HOME | End: 2024-06-10
Attending: INTERNAL MEDICINE | Admitting: INTERNAL MEDICINE
Payer: COMMERCIAL

## 2024-06-10 VITALS
BODY MASS INDEX: 38.41 KG/M2 | HEIGHT: 66 IN | SYSTOLIC BLOOD PRESSURE: 124 MMHG | OXYGEN SATURATION: 97 % | DIASTOLIC BLOOD PRESSURE: 68 MMHG | WEIGHT: 239 LBS | HEART RATE: 80 BPM | RESPIRATION RATE: 17 BRPM

## 2024-06-10 DIAGNOSIS — I48.0 PAROXYSMAL ATRIAL FIBRILLATION (HCC): ICD-10-CM

## 2024-06-10 LAB
ANION GAP SERPL CALC-SCNC: 13 MMOL/L (ref 9–18)
BUN SERPL-MCNC: 16 MG/DL (ref 8–23)
CHLORIDE SERPL-SCNC: 104 MMOL/L (ref 98–107)
CO2 SERPL-SCNC: 22 MMOL/L (ref 20–28)
CREAT SERPL-MCNC: 0.94 MG/DL (ref 0.6–1.1)
ECHO AO DESC DIAM: 2.6 CM
ECHO AO DESCENDING AORTA INDEX: 1.2 CM/M2
ECHO AO SINUS VALSALVA DIAM: 2.9 CM
ECHO AO SINUS VALSALVA INDEX: 1.34 CM/M2
ECHO BSA: 2.25 M2
EKG ATRIAL RATE: 74 BPM
EKG DIAGNOSIS: NORMAL
EKG DIAGNOSIS: NORMAL
EKG P AXIS: 47 DEGREES
EKG P-R INTERVAL: 164 MS
EKG Q-T INTERVAL: 352 MS
EKG Q-T INTERVAL: 400 MS
EKG QRS DURATION: 84 MS
EKG QRS DURATION: 90 MS
EKG QTC CALCULATION (BAZETT): 440 MS
EKG QTC CALCULATION (BAZETT): 444 MS
EKG R AXIS: 39 DEGREES
EKG R AXIS: 47 DEGREES
EKG T AXIS: 23 DEGREES
EKG T AXIS: 32 DEGREES
EKG VENTRICULAR RATE: 74 BPM
EKG VENTRICULAR RATE: 94 BPM
ERYTHROCYTE [DISTWIDTH] IN BLOOD BY AUTOMATED COUNT: 15.6 % (ref 11.9–14.6)
GLUCOSE SERPL-MCNC: 100 MG/DL (ref 70–99)
HCT VFR BLD AUTO: 35.9 % (ref 35.8–46.3)
HGB BLD-MCNC: 11.2 G/DL (ref 11.7–15.4)
MAGNESIUM SERPL-MCNC: 1.7 MG/DL (ref 1.8–2.4)
MCH RBC QN AUTO: 27.8 PG (ref 26.1–32.9)
MCHC RBC AUTO-ENTMCNC: 31.2 G/DL (ref 31.4–35)
MCV RBC AUTO: 89.1 FL (ref 82–102)
NRBC # BLD: 0 K/UL (ref 0–0.2)
PLATELET # BLD AUTO: 250 K/UL (ref 150–450)
PMV BLD AUTO: 10.7 FL (ref 9.4–12.3)
POTASSIUM SERPL-SCNC: 5 MMOL/L (ref 3.5–5.1)
RBC # BLD AUTO: 4.03 M/UL (ref 4.05–5.2)
SODIUM SERPL-SCNC: 139 MMOL/L (ref 136–145)
WBC # BLD AUTO: 9 K/UL (ref 4.3–11.1)

## 2024-06-10 PROCEDURE — 93312 ECHO TRANSESOPHAGEAL: CPT | Performed by: INTERNAL MEDICINE

## 2024-06-10 PROCEDURE — 93319 3D ECHO IMG CGEN CAR ANOMAL: CPT | Performed by: INTERNAL MEDICINE

## 2024-06-10 PROCEDURE — 85027 COMPLETE CBC AUTOMATED: CPT

## 2024-06-10 PROCEDURE — 93320 DOPPLER ECHO COMPLETE: CPT | Performed by: INTERNAL MEDICINE

## 2024-06-10 PROCEDURE — 83735 ASSAY OF MAGNESIUM: CPT

## 2024-06-10 PROCEDURE — 93005 ELECTROCARDIOGRAM TRACING: CPT | Performed by: INTERNAL MEDICINE

## 2024-06-10 PROCEDURE — 80048 BASIC METABOLIC PNL TOTAL CA: CPT

## 2024-06-10 PROCEDURE — 99152 MOD SED SAME PHYS/QHP 5/>YRS: CPT | Performed by: INTERNAL MEDICINE

## 2024-06-10 PROCEDURE — 6360000002 HC RX W HCPCS: Performed by: INTERNAL MEDICINE

## 2024-06-10 PROCEDURE — 6370000000 HC RX 637 (ALT 250 FOR IP): Performed by: INTERNAL MEDICINE

## 2024-06-10 PROCEDURE — 93319 3D ECHO IMG CGEN CAR ANOMAL: CPT

## 2024-06-10 PROCEDURE — 92960 CARDIOVERSION ELECTRIC EXT: CPT | Performed by: INTERNAL MEDICINE

## 2024-06-10 RX ORDER — SODIUM CHLORIDE 9 MG/ML
INJECTION, SOLUTION INTRAVENOUS CONTINUOUS
Status: DISCONTINUED | OUTPATIENT
Start: 2024-06-10 | End: 2024-06-10 | Stop reason: HOSPADM

## 2024-06-10 RX ORDER — LIDOCAINE HYDROCHLORIDE 20 MG/ML
SOLUTION OROPHARYNGEAL PRN
Status: COMPLETED | OUTPATIENT
Start: 2024-06-10 | End: 2024-06-10

## 2024-06-10 RX ORDER — MIDAZOLAM HYDROCHLORIDE 1 MG/ML
INJECTION INTRAMUSCULAR; INTRAVENOUS PRN
Status: COMPLETED | OUTPATIENT
Start: 2024-06-10 | End: 2024-06-10

## 2024-06-10 RX ORDER — FENTANYL CITRATE 50 UG/ML
INJECTION, SOLUTION INTRAMUSCULAR; INTRAVENOUS PRN
Status: COMPLETED | OUTPATIENT
Start: 2024-06-10 | End: 2024-06-10

## 2024-06-10 RX ADMIN — LIDOCAINE HYDROCHLORIDE 15 ML: 20 SOLUTION ORAL at 12:43

## 2024-06-10 RX ADMIN — MIDAZOLAM 2 MG: 1 INJECTION INTRAMUSCULAR; INTRAVENOUS at 12:50

## 2024-06-10 RX ADMIN — MIDAZOLAM 1 MG: 1 INJECTION INTRAMUSCULAR; INTRAVENOUS at 12:57

## 2024-06-10 RX ADMIN — MIDAZOLAM 1 MG: 1 INJECTION INTRAMUSCULAR; INTRAVENOUS at 13:03

## 2024-06-10 RX ADMIN — FENTANYL CITRATE 25 MCG: 50 INJECTION, SOLUTION INTRAMUSCULAR; INTRAVENOUS at 12:50

## 2024-06-10 RX ADMIN — FENTANYL CITRATE 25 MCG: 50 INJECTION, SOLUTION INTRAMUSCULAR; INTRAVENOUS at 12:54

## 2024-06-10 RX ADMIN — MIDAZOLAM 2 MG: 1 INJECTION INTRAMUSCULAR; INTRAVENOUS at 12:53

## 2024-06-10 RX ADMIN — FENTANYL CITRATE 25 MCG: 50 INJECTION, SOLUTION INTRAMUSCULAR; INTRAVENOUS at 12:58

## 2024-06-10 NOTE — PROGRESS NOTES
MANDO/CVN with Dr. Delgado    Arrived afib rate 110s  Cardioverted x1 @ 360J  Now sinus rate 70s    Versed 6mg IV  Fentanyl 75mcg IV  Viscous lidocaine @ 1243

## 2024-06-10 NOTE — PROGRESS NOTES
Patient received to CPRU room # 12  Ambulatory from [facility]. Patient scheduled for MANDO/CVN today with Dr Delgado. Procedure reviewed & questions answered, voiced good understanding consent obtained & placed on chart. All medications and medical history reviewed. Will prep patient per orders. Patient & family updated on plan of care.      The patient has a fraility score of 3-MANAGING WELL, based on ambulation without assistance.

## 2024-06-10 NOTE — PROGRESS NOTES
Transesophageal Echo Note:  - Indication: atrial fibrillation  - pt underwent successful MANDO today in cath holding  - start 1250  - stop 1306  - sedation: 6 mg IV Midazolam, 75 mcg Fentanyl IV given by Cassidy Murray RN under my direct supervision. Direct monitoring of vital signs and respiratory status throughout the procedure.   - An independent trained observer pushed medications at my direction. We monitored the patient's level of consciousness and vital signs/physiologic status throughout the procedure duration (see start and stop times above).   - No complications, pt in stable condition  - MANDO Brief Findings: no LA/AL thrombus, LVEF appears low normal. 3d images obtained.   - Complete MANDO report to follow.   - appropriate for cardioversion attempt.     Direct Current Cardioversion:  - Indication : atrial fibrillation  - synchronized DC Cardioversion 360 J x 1  - Results: sinus rhythm  - 12 lead EKG pending at this time  - OK for oral intake at 1506  - No driving or heavy machine operation today.   - Results discussed with patient and family at bedside.  - No medication changes, continue Eliquis 5 mg PO BID and Metoprolol as prescribed.

## 2024-06-10 NOTE — DISCHARGE INSTRUCTIONS
AFTER YOU TRANSESOPHAGEAL ECHOCARDIOGRAM    Be sure someone else drives you home. You may feel drowsy for several hours.    Do not eat or drink for at least two hours (1445)  after your procedure. Your throat will be numb and there is a risk you might have difficulty swallowing for a while. Be careful when you do eat or drink for the first time especially with hot fluids since you could easily burn your throat.    Call your doctor if:    You are bleeding from your throat or mouth.  You have trouble breathing all of a sudden.  You have chest pain or any pain that spreads to your neck, jaw, or arms.  You have questions or concerns.  You have a fever greater than 101°F.    Discharge Instructions for Cardioversion    Your healthcare provider performed a procedure called cardioversion. Your healthcare provider used a controlled electric shock or a medicine to briefly stop all electrical activity in your heart. This helped restore your heart’s normal rhythm. Here are some instructions to follow while you recover.    Home care  Because cardioversion typically requires sedation, you won't be able to drive home. You will need a ride. Wait at least 24 hours before driving a car or operating heavy machinery after receiving sedating medicines.    Don’t be alarmed if the skin on your chest is irritated or feels like it is sunburned. Your healthcare provider may prescribe a soothing lotion to relieve this discomfort. These minor symptoms will go away in a few days.    Ask your healthcare provider about medicines to keep your heart rhythm steady.    If you were prescribed medicine, take it as instructed by your healthcare provider. Don’t skip doses or take double doses. Cardioversion requires blood thinners for at least 4 weeks to prevent a delayed risk of stroke when treating atrial fibrillation or atrial flutter. Be sure you discuss which medicine you are taking to prevent stroke. Ask when you need to have your medicine levels

## 2024-06-14 DIAGNOSIS — K21.9 GASTROESOPHAGEAL REFLUX DISEASE WITHOUT ESOPHAGITIS: ICD-10-CM

## 2024-06-18 RX ORDER — OMEPRAZOLE 40 MG/1
40 CAPSULE, DELAYED RELEASE ORAL DAILY
Qty: 90 CAPSULE | Refills: 1 | OUTPATIENT
Start: 2024-06-18

## 2024-06-19 DIAGNOSIS — K21.9 GASTROESOPHAGEAL REFLUX DISEASE WITHOUT ESOPHAGITIS: ICD-10-CM

## 2024-06-19 DIAGNOSIS — J30.9 ALLERGIC RHINITIS, UNSPECIFIED SEASONALITY, UNSPECIFIED TRIGGER: ICD-10-CM

## 2024-06-19 DIAGNOSIS — R05.3 CHRONIC COUGH: ICD-10-CM

## 2024-06-20 ENCOUNTER — OFFICE VISIT (OUTPATIENT)
Age: 74
End: 2024-06-20
Payer: COMMERCIAL

## 2024-06-20 VITALS
HEIGHT: 66 IN | SYSTOLIC BLOOD PRESSURE: 124 MMHG | HEART RATE: 76 BPM | DIASTOLIC BLOOD PRESSURE: 76 MMHG | WEIGHT: 232 LBS | BODY MASS INDEX: 37.28 KG/M2

## 2024-06-20 DIAGNOSIS — I48.0 PAROXYSMAL A-FIB (HCC): ICD-10-CM

## 2024-06-20 DIAGNOSIS — I10 PRIMARY HYPERTENSION: Primary | ICD-10-CM

## 2024-06-20 DIAGNOSIS — I50.22 CHRONIC SYSTOLIC CONGESTIVE HEART FAILURE (HCC): ICD-10-CM

## 2024-06-20 PROCEDURE — 3074F SYST BP LT 130 MM HG: CPT | Performed by: INTERNAL MEDICINE

## 2024-06-20 PROCEDURE — 1123F ACP DISCUSS/DSCN MKR DOCD: CPT | Performed by: INTERNAL MEDICINE

## 2024-06-20 PROCEDURE — 99214 OFFICE O/P EST MOD 30 MIN: CPT | Performed by: INTERNAL MEDICINE

## 2024-06-20 PROCEDURE — 3078F DIAST BP <80 MM HG: CPT | Performed by: INTERNAL MEDICINE

## 2024-06-20 PROCEDURE — 93000 ELECTROCARDIOGRAM COMPLETE: CPT | Performed by: INTERNAL MEDICINE

## 2024-06-20 RX ORDER — FLUTICASONE FUROATE AND VILANTEROL TRIFENATATE 100; 25 UG/1; UG/1
1 POWDER RESPIRATORY (INHALATION) DAILY
Qty: 1 EACH | Refills: 11 | OUTPATIENT
Start: 2024-06-20

## 2024-06-20 RX ORDER — OMEPRAZOLE 40 MG/1
40 CAPSULE, DELAYED RELEASE ORAL DAILY
Qty: 90 CAPSULE | Refills: 1 | OUTPATIENT
Start: 2024-06-20

## 2024-06-20 ASSESSMENT — ENCOUNTER SYMPTOMS: SHORTNESS OF BREATH: 0

## 2024-06-20 NOTE — PROGRESS NOTES
[address], [place] [address]  PHONE: [phone]    Lubna Cabezas  1950      SUBJECTIVE:   Lubna Cabezas is a 73 y.o. female seen for a follow up visit regarding the following:     Chief Complaint   Patient presents with    Follow-up     cardioversion    Atrial Fibrillation       HPI:    Patient presents for follow-up.  She was seen on June 7 with atrial fibrillation.  This is a new diagnosis.  She was started on Eliquis and referred for MANDO cardioversion which was performed by Dr. Delgado as outlined below.    MANDO/DCCV (6/10/24):  EF 50-55%.  Mild MR/TR.  Mild LAE.  Successful cardioversion.     She has felt much better since her cardioversion.  Less dyspnea.  Occasional episodes of palpitations which have been short-lived.  No sustained atrial fibrillation.  She is interested in considering atrial fibrillation ablation.  She is tolerating Eliquis.      Past Medical History, Past Surgical History, Family history, Social History, and Medications were all reviewed with the patient today and updated as necessary.           Current Outpatient Medications:     potassium chloride (KLOR-CON) 10 MEQ extended release tablet, Take 1 tablet by mouth daily, Disp: 90 tablet, Rfl: 3    apixaban (ELIQUIS) 5 MG TABS tablet, Take 1 tablet by mouth 2 times daily, Disp: 180 tablet, Rfl: 1    omeprazole (PRILOSEC) 40 MG delayed release capsule, Take 1 capsule by mouth daily, Disp: 90 capsule, Rfl: 1    losartan (COZAAR) 50 MG tablet, Take 1 tablet by mouth daily, Disp: 90 tablet, Rfl: 3    metoprolol succinate (TOPROL XL) 100 MG extended release tablet, TAKE 1 (ONE) TABLET BY MOUTH EVERY DAY, Disp: 90 tablet, Rfl: 3    BREO ELLIPTA 100-25 MCG/ACT inhaler, Inhale 1 puff into the lungs daily, Disp: 1 each, Rfl: 11    cetirizine (ZYRTEC) 10 MG tablet, Take 1 tablet by mouth daily, Disp: 90 tablet, Rfl: 3    montelukast (SINGULAIR) 10 MG tablet, Take 1 tablet by mouth daily, Disp: 90

## 2024-07-03 DIAGNOSIS — J32.9 RECURRENT SINUSITIS: Primary | ICD-10-CM

## 2024-07-20 DIAGNOSIS — J01.90 ACUTE BACTERIAL SINUSITIS: ICD-10-CM

## 2024-07-20 DIAGNOSIS — B96.89 ACUTE BACTERIAL SINUSITIS: ICD-10-CM

## 2024-07-22 ENCOUNTER — HOSPITAL ENCOUNTER (OUTPATIENT)
Dept: CT IMAGING | Age: 74
Discharge: HOME | End: 2024-07-25
Attending: INTERNAL MEDICINE
Payer: COMMERCIAL

## 2024-07-22 DIAGNOSIS — J32.9 RECURRENT SINUSITIS: ICD-10-CM

## 2024-07-22 PROCEDURE — 70486 CT MAXILLOFACIAL W/O DYE: CPT

## 2024-07-22 PROCEDURE — 70486 CT MAXILLOFACIAL W/O DYE: CPT | Performed by: RADIOLOGY

## 2024-07-22 RX ORDER — GUAIFENESIN 600 MG/1
600 TABLET, EXTENDED RELEASE ORAL 2 TIMES DAILY
Qty: 20 TABLET | Refills: 0 | OUTPATIENT
Start: 2024-07-22

## 2024-07-22 NOTE — TELEPHONE ENCOUNTER
Patient Was see on 6/7/2024. Patient was prescribed Mucinex 600 mg Take 1 tablet  y mouth 2x a day for 10 days.    Called patient to see what was the reason for the refill. Left VM for the patient to call the office back.

## 2024-07-25 ENCOUNTER — TELEPHONE (OUTPATIENT)
Dept: PULMONOLOGY | Age: 74
End: 2024-07-25

## 2024-07-25 DIAGNOSIS — J32.8 OTHER CHRONIC SINUSITIS: Primary | ICD-10-CM

## 2024-07-25 NOTE — TELEPHONE ENCOUNTER
Spoke to patient and let her know her sinus CT showed some R-sided chronic sinus disease and Dr. Penny recommends she see Dr. Valentino Cobb in [place], an ENT there. Patient agrees, and referral placed and she voices understanding.

## 2024-07-25 NOTE — TELEPHONE ENCOUNTER
----- Message from Stefania Penny MD sent at 7/24/2024 12:13 PM EDT -----  Please let patient know her sinus CT showed some R-sided chronic sinus disease and I recommend she see Dr. Valentino Cobb in [place], an ENT there.  If she agrees, please place referral.

## 2024-08-22 ENCOUNTER — TELEPHONE (OUTPATIENT)
Age: 74
End: 2024-08-22

## 2024-08-22 ENCOUNTER — INITIAL CONSULT (OUTPATIENT)
Age: 74
End: 2024-08-22
Payer: COMMERCIAL

## 2024-08-22 VITALS
WEIGHT: 238 LBS | HEIGHT: 66 IN | BODY MASS INDEX: 38.25 KG/M2 | HEART RATE: 72 BPM | DIASTOLIC BLOOD PRESSURE: 72 MMHG | SYSTOLIC BLOOD PRESSURE: 122 MMHG

## 2024-08-22 DIAGNOSIS — I10 PRIMARY HYPERTENSION: ICD-10-CM

## 2024-08-22 DIAGNOSIS — I48.19 PERSISTENT ATRIAL FIBRILLATION (HCC): Primary | ICD-10-CM

## 2024-08-22 DIAGNOSIS — I48.0 PAROXYSMAL ATRIAL FIBRILLATION (HCC): Primary | ICD-10-CM

## 2024-08-22 PROCEDURE — 3078F DIAST BP <80 MM HG: CPT | Performed by: INTERNAL MEDICINE

## 2024-08-22 PROCEDURE — 1123F ACP DISCUSS/DSCN MKR DOCD: CPT | Performed by: INTERNAL MEDICINE

## 2024-08-22 PROCEDURE — 3074F SYST BP LT 130 MM HG: CPT | Performed by: INTERNAL MEDICINE

## 2024-08-22 PROCEDURE — 93000 ELECTROCARDIOGRAM COMPLETE: CPT | Performed by: INTERNAL MEDICINE

## 2024-08-22 PROCEDURE — 99204 OFFICE O/P NEW MOD 45 MIN: CPT | Performed by: INTERNAL MEDICINE

## 2024-08-22 NOTE — PROGRESS NOTES
monitoring and reduced energy and ablation time in areas in close proximity to reduce this risk.  A more detailed discussion of possible complications from cardiac ablation will be addressed again during the consent process the day of the procedure.  The patient will also meet with the staff anesthesiologist the day of the procedure to discuss some of the same possible risks, as well as other risks, specific to the patient undergoing anesthesia.  Pt will undergo a transesophageal echocardiogram immediately prior to catheter insertion to completely exclude the possibility of left atrial appendage thrombus. We will follow up with patient post hospitalization.       --MANDO followed by afib ablation       --hold eliquis the day prior     2. CVA protection: eliquis 5mg Q12H    3. HTN: blood pressure controlled, cont losartan 50mg, aldactone 25mg    Patient has been instructed and agrees to call our office with any issues or other concerns related to their cardiac condition(s) and/or complaint(s).    No follow-up provider specified.      Joey Christensen MD, MS  Cardiology/Electrophysiology  08/22/24  9:22 AM

## 2024-09-09 ENCOUNTER — OFFICE VISIT (OUTPATIENT)
Age: 74
End: 2024-09-09
Payer: COMMERCIAL

## 2024-09-09 VITALS
WEIGHT: 238.8 LBS | HEIGHT: 66 IN | DIASTOLIC BLOOD PRESSURE: 63 MMHG | HEART RATE: 77 BPM | BODY MASS INDEX: 38.38 KG/M2 | SYSTOLIC BLOOD PRESSURE: 107 MMHG

## 2024-09-09 DIAGNOSIS — I10 PRIMARY HYPERTENSION: ICD-10-CM

## 2024-09-09 DIAGNOSIS — I48.0 PAROXYSMAL ATRIAL FIBRILLATION (HCC): Primary | ICD-10-CM

## 2024-09-09 DIAGNOSIS — E66.01 SEVERE OBESITY (HCC): ICD-10-CM

## 2024-09-09 DIAGNOSIS — R60.0 LOCALIZED EDEMA: ICD-10-CM

## 2024-09-09 DIAGNOSIS — I50.22 CHRONIC SYSTOLIC CONGESTIVE HEART FAILURE (HCC): ICD-10-CM

## 2024-09-09 PROBLEM — R00.0 TACHYCARDIA: Status: RESOLVED | Noted: 2021-11-01 | Resolved: 2024-09-09

## 2024-09-09 PROCEDURE — 93000 ELECTROCARDIOGRAM COMPLETE: CPT | Performed by: INTERNAL MEDICINE

## 2024-09-09 PROCEDURE — 3078F DIAST BP <80 MM HG: CPT | Performed by: INTERNAL MEDICINE

## 2024-09-09 PROCEDURE — 99214 OFFICE O/P EST MOD 30 MIN: CPT | Performed by: INTERNAL MEDICINE

## 2024-09-09 PROCEDURE — 3074F SYST BP LT 130 MM HG: CPT | Performed by: INTERNAL MEDICINE

## 2024-09-09 PROCEDURE — 1123F ACP DISCUSS/DSCN MKR DOCD: CPT | Performed by: INTERNAL MEDICINE

## 2024-09-09 RX ORDER — FUROSEMIDE 40 MG
TABLET ORAL
Qty: 60 TABLET | Refills: 3 | Status: SHIPPED | OUTPATIENT
Start: 2024-09-09

## 2024-09-09 ASSESSMENT — ENCOUNTER SYMPTOMS
SHORTNESS OF BREATH: 0
HOARSE VOICE: 1

## 2024-09-30 DIAGNOSIS — I48.19 PERSISTENT ATRIAL FIBRILLATION (HCC): ICD-10-CM

## 2024-09-30 LAB
ANION GAP SERPL CALC-SCNC: 10 MMOL/L (ref 9–18)
BUN SERPL-MCNC: 11 MG/DL (ref 8–23)
CALCIUM SERPL-MCNC: 9.1 MG/DL (ref 8.8–10.2)
CHLORIDE SERPL-SCNC: 106 MMOL/L (ref 98–107)
CO2 SERPL-SCNC: 25 MMOL/L (ref 20–28)
CREAT SERPL-MCNC: 1.05 MG/DL (ref 0.6–1.1)
ERYTHROCYTE [DISTWIDTH] IN BLOOD BY AUTOMATED COUNT: 14.2 % (ref 11.9–14.6)
GLUCOSE SERPL-MCNC: 81 MG/DL (ref 70–99)
HCT VFR BLD AUTO: 36.6 % (ref 35.8–46.3)
HGB BLD-MCNC: 11.1 G/DL (ref 11.7–15.4)
MAGNESIUM SERPL-MCNC: 1.7 MG/DL (ref 1.8–2.4)
MCH RBC QN AUTO: 28.7 PG (ref 26.1–32.9)
MCHC RBC AUTO-ENTMCNC: 30.3 G/DL (ref 31.4–35)
MCV RBC AUTO: 94.6 FL (ref 82–102)
NRBC # BLD: 0 K/UL (ref 0–0.2)
PLATELET # BLD AUTO: 249 K/UL (ref 150–450)
PMV BLD AUTO: 11.3 FL (ref 9.4–12.3)
POTASSIUM SERPL-SCNC: 4.3 MMOL/L (ref 3.5–5.1)
RBC # BLD AUTO: 3.87 M/UL (ref 4.05–5.2)
SODIUM SERPL-SCNC: 141 MMOL/L (ref 136–145)
WBC # BLD AUTO: 7.8 K/UL (ref 4.3–11.1)

## 2024-10-08 ENCOUNTER — ANESTHESIA EVENT (OUTPATIENT)
Dept: CARDIAC CATH/INVASIVE PROCEDURES | Age: 74
End: 2024-10-08
Payer: COMMERCIAL

## 2024-10-08 RX ORDER — FENTANYL CITRATE 50 UG/ML
25 INJECTION, SOLUTION INTRAMUSCULAR; INTRAVENOUS
Status: CANCELLED | OUTPATIENT
Start: 2024-10-08 | End: 2024-10-09

## 2024-10-08 RX ORDER — MIDAZOLAM HYDROCHLORIDE 2 MG/2ML
2 INJECTION, SOLUTION INTRAMUSCULAR; INTRAVENOUS
Status: CANCELLED | OUTPATIENT
Start: 2024-10-08 | End: 2024-10-09

## 2024-10-08 RX ORDER — SODIUM CHLORIDE 9 MG/ML
INJECTION, SOLUTION INTRAVENOUS PRN
Status: CANCELLED | OUTPATIENT
Start: 2024-10-08

## 2024-10-08 RX ORDER — SODIUM CHLORIDE 0.9 % (FLUSH) 0.9 %
5-40 SYRINGE (ML) INJECTION PRN
Status: CANCELLED | OUTPATIENT
Start: 2024-10-08

## 2024-10-08 RX ORDER — LIDOCAINE HYDROCHLORIDE 10 MG/ML
1 INJECTION, SOLUTION INFILTRATION; PERINEURAL
Status: CANCELLED | OUTPATIENT
Start: 2024-10-08 | End: 2024-10-09

## 2024-10-08 RX ORDER — SODIUM CHLORIDE 0.9 % (FLUSH) 0.9 %
5-40 SYRINGE (ML) INJECTION EVERY 12 HOURS SCHEDULED
Status: CANCELLED | OUTPATIENT
Start: 2024-10-08

## 2024-10-08 RX ORDER — SODIUM CHLORIDE, SODIUM LACTATE, POTASSIUM CHLORIDE, CALCIUM CHLORIDE 600; 310; 30; 20 MG/100ML; MG/100ML; MG/100ML; MG/100ML
INJECTION, SOLUTION INTRAVENOUS CONTINUOUS
Status: CANCELLED | OUTPATIENT
Start: 2024-10-08

## 2024-10-08 RX ORDER — FENTANYL CITRATE 50 UG/ML
100 INJECTION, SOLUTION INTRAMUSCULAR; INTRAVENOUS
Status: CANCELLED | OUTPATIENT
Start: 2024-10-08 | End: 2024-10-09

## 2024-10-08 NOTE — PROGRESS NOTES
Patient pre-assessment complete for atrial fibrillation ablation scheduled for 10/9/24, arrival time 0600. Patient verified using . Patient instructed to bring a list of all home medications on the day of procedure. NPO status reinforced.  Patient instructed to HOLD Lasix, Eliquis, Spironolactone, and Losartan. Instructed they can take all other medications excluding vitamins & supplements. Patient verbalizes understanding of all instructions & denies any questions at this time.

## 2024-10-09 ENCOUNTER — APPOINTMENT (OUTPATIENT)
Dept: CARDIAC CATH/INVASIVE PROCEDURES | Age: 74
End: 2024-10-09
Attending: INTERNAL MEDICINE
Payer: COMMERCIAL

## 2024-10-09 ENCOUNTER — ANESTHESIA (OUTPATIENT)
Dept: CARDIAC CATH/INVASIVE PROCEDURES | Age: 74
End: 2024-10-09
Payer: COMMERCIAL

## 2024-10-09 ENCOUNTER — HOSPITAL ENCOUNTER (OUTPATIENT)
Age: 74
Discharge: HOME | End: 2024-10-09
Attending: INTERNAL MEDICINE | Admitting: INTERNAL MEDICINE
Payer: COMMERCIAL

## 2024-10-09 VITALS
OXYGEN SATURATION: 98 % | SYSTOLIC BLOOD PRESSURE: 126 MMHG | HEART RATE: 82 BPM | RESPIRATION RATE: 13 BRPM | HEIGHT: 66 IN | BODY MASS INDEX: 38.25 KG/M2 | DIASTOLIC BLOOD PRESSURE: 63 MMHG | WEIGHT: 238 LBS | TEMPERATURE: 98.4 F

## 2024-10-09 DIAGNOSIS — I48.19 PERSISTENT ATRIAL FIBRILLATION (HCC): ICD-10-CM

## 2024-10-09 LAB
ACT BLD: 311 SECS (ref 70–128)
ANION GAP SERPL CALC-SCNC: 12 MMOL/L (ref 9–18)
BUN SERPL-MCNC: 14 MG/DL (ref 8–23)
CALCIUM SERPL-MCNC: 8.7 MG/DL (ref 8.8–10.2)
CHLORIDE SERPL-SCNC: 105 MMOL/L (ref 98–107)
CO2 SERPL-SCNC: 23 MMOL/L (ref 20–28)
CREAT SERPL-MCNC: 0.98 MG/DL (ref 0.6–1.1)
ECHO BSA: 2.24 M2
ECHO BSA: 2.24 M2
EKG ATRIAL RATE: 80 BPM
EKG DIAGNOSIS: NORMAL
EKG P AXIS: 37 DEGREES
EKG P-R INTERVAL: 188 MS
EKG Q-T INTERVAL: 380 MS
EKG QRS DURATION: 92 MS
EKG QTC CALCULATION (BAZETT): 438 MS
EKG R AXIS: 41 DEGREES
EKG T AXIS: 17 DEGREES
EKG VENTRICULAR RATE: 80 BPM
ERYTHROCYTE [DISTWIDTH] IN BLOOD BY AUTOMATED COUNT: 14.1 % (ref 11.9–14.6)
GLUCOSE SERPL-MCNC: 108 MG/DL (ref 70–99)
HCT VFR BLD AUTO: 35.9 % (ref 35.8–46.3)
HGB BLD-MCNC: 11.3 G/DL (ref 11.7–15.4)
MAGNESIUM SERPL-MCNC: 1.5 MG/DL (ref 1.8–2.4)
MCH RBC QN AUTO: 28.3 PG (ref 26.1–32.9)
MCHC RBC AUTO-ENTMCNC: 31.5 G/DL (ref 31.4–35)
MCV RBC AUTO: 90 FL (ref 82–102)
NRBC # BLD: 0 K/UL (ref 0–0.2)
PLATELET # BLD AUTO: 236 K/UL (ref 150–450)
PMV BLD AUTO: 10.4 FL (ref 9.4–12.3)
POTASSIUM SERPL-SCNC: 4 MMOL/L (ref 3.5–5.1)
RBC # BLD AUTO: 3.99 M/UL (ref 4.05–5.2)
SODIUM SERPL-SCNC: 140 MMOL/L (ref 136–145)
WBC # BLD AUTO: 8.8 K/UL (ref 4.3–11.1)

## 2024-10-09 PROCEDURE — 3700000001 HC ADD 15 MINUTES (ANESTHESIA): Performed by: INTERNAL MEDICINE

## 2024-10-09 PROCEDURE — 2580000003 HC RX 258: Performed by: NURSE ANESTHETIST, CERTIFIED REGISTERED

## 2024-10-09 PROCEDURE — 93622 COMP EP EVAL L VENTR PAC&REC: CPT | Performed by: INTERNAL MEDICINE

## 2024-10-09 PROCEDURE — 7100000000 HC PACU RECOVERY - FIRST 15 MIN: Performed by: INTERNAL MEDICINE

## 2024-10-09 PROCEDURE — C1760 CLOSURE DEV, VASC: HCPCS | Performed by: INTERNAL MEDICINE

## 2024-10-09 PROCEDURE — 93657 TX L/R ATRIAL FIB ADDL: CPT | Performed by: INTERNAL MEDICINE

## 2024-10-09 PROCEDURE — 83735 ASSAY OF MAGNESIUM: CPT

## 2024-10-09 PROCEDURE — C1732 CATH, EP, DIAG/ABL, 3D/VECT: HCPCS | Performed by: INTERNAL MEDICINE

## 2024-10-09 PROCEDURE — 2720000010 HC SURG SUPPLY STERILE: Performed by: INTERNAL MEDICINE

## 2024-10-09 PROCEDURE — 93010 ELECTROCARDIOGRAM REPORT: CPT | Performed by: INTERNAL MEDICINE

## 2024-10-09 PROCEDURE — 93623 PRGRMD STIMJ&PACG IV RX NFS: CPT | Performed by: INTERNAL MEDICINE

## 2024-10-09 PROCEDURE — 6360000002 HC RX W HCPCS: Performed by: NURSE ANESTHETIST, CERTIFIED REGISTERED

## 2024-10-09 PROCEDURE — 2709999900 HC NON-CHARGEABLE SUPPLY: Performed by: INTERNAL MEDICINE

## 2024-10-09 PROCEDURE — 2500000003 HC RX 250 WO HCPCS: Performed by: NURSE ANESTHETIST, CERTIFIED REGISTERED

## 2024-10-09 PROCEDURE — C1759 CATH, INTRA ECHOCARDIOGRAPHY: HCPCS | Performed by: INTERNAL MEDICINE

## 2024-10-09 PROCEDURE — 85027 COMPLETE CBC AUTOMATED: CPT

## 2024-10-09 PROCEDURE — 80048 BASIC METABOLIC PNL TOTAL CA: CPT

## 2024-10-09 PROCEDURE — 7100000001 HC PACU RECOVERY - ADDTL 15 MIN: Performed by: INTERNAL MEDICINE

## 2024-10-09 PROCEDURE — C1894 INTRO/SHEATH, NON-LASER: HCPCS | Performed by: INTERNAL MEDICINE

## 2024-10-09 PROCEDURE — 6360000002 HC RX W HCPCS: Performed by: INTERNAL MEDICINE

## 2024-10-09 PROCEDURE — 3700000000 HC ANESTHESIA ATTENDED CARE: Performed by: INTERNAL MEDICINE

## 2024-10-09 PROCEDURE — 93312 ECHO TRANSESOPHAGEAL: CPT

## 2024-10-09 PROCEDURE — C1730 CATH, EP, 19 OR FEW ELECT: HCPCS | Performed by: INTERNAL MEDICINE

## 2024-10-09 PROCEDURE — C1893 INTRO/SHEATH, FIXED,NON-PEEL: HCPCS | Performed by: INTERNAL MEDICINE

## 2024-10-09 PROCEDURE — 93655 ICAR CATH ABLTJ DSCRT ARRHYT: CPT | Performed by: INTERNAL MEDICINE

## 2024-10-09 PROCEDURE — 93656 COMPRE EP EVAL ABLTJ ATR FIB: CPT | Performed by: INTERNAL MEDICINE

## 2024-10-09 PROCEDURE — 85347 COAGULATION TIME ACTIVATED: CPT

## 2024-10-09 PROCEDURE — 93005 ELECTROCARDIOGRAM TRACING: CPT | Performed by: INTERNAL MEDICINE

## 2024-10-09 RX ORDER — PROPOFOL 10 MG/ML
INJECTION, EMULSION INTRAVENOUS
Status: DISCONTINUED | OUTPATIENT
Start: 2024-10-09 | End: 2024-10-09 | Stop reason: SDUPTHER

## 2024-10-09 RX ORDER — PROTAMINE SULFATE 10 MG/ML
INJECTION, SOLUTION INTRAVENOUS
Status: DISCONTINUED | OUTPATIENT
Start: 2024-10-09 | End: 2024-10-09 | Stop reason: SDUPTHER

## 2024-10-09 RX ORDER — SUCRALFATE 1 G/1
1 TABLET ORAL 4 TIMES DAILY
Qty: 120 TABLET | Refills: 0 | Status: SHIPPED | OUTPATIENT
Start: 2024-10-09

## 2024-10-09 RX ORDER — OXYCODONE HYDROCHLORIDE 5 MG/1
10 TABLET ORAL PRN
Status: DISCONTINUED | OUTPATIENT
Start: 2024-10-09 | End: 2024-10-09 | Stop reason: HOSPADM

## 2024-10-09 RX ORDER — SODIUM CHLORIDE, SODIUM LACTATE, POTASSIUM CHLORIDE, CALCIUM CHLORIDE 600; 310; 30; 20 MG/100ML; MG/100ML; MG/100ML; MG/100ML
INJECTION, SOLUTION INTRAVENOUS
Status: DISCONTINUED | OUTPATIENT
Start: 2024-10-09 | End: 2024-10-09 | Stop reason: SDUPTHER

## 2024-10-09 RX ORDER — HEPARIN SODIUM 1000 [USP'U]/ML
INJECTION, SOLUTION INTRAVENOUS; SUBCUTANEOUS
Status: DISCONTINUED | OUTPATIENT
Start: 2024-10-09 | End: 2024-10-09 | Stop reason: SDUPTHER

## 2024-10-09 RX ORDER — OXYCODONE HYDROCHLORIDE 5 MG/1
5 TABLET ORAL PRN
Status: DISCONTINUED | OUTPATIENT
Start: 2024-10-09 | End: 2024-10-09 | Stop reason: HOSPADM

## 2024-10-09 RX ORDER — HYDROMORPHONE HYDROCHLORIDE 2 MG/ML
0.5 INJECTION, SOLUTION INTRAMUSCULAR; INTRAVENOUS; SUBCUTANEOUS EVERY 5 MIN PRN
Status: DISCONTINUED | OUTPATIENT
Start: 2024-10-09 | End: 2024-10-09 | Stop reason: HOSPADM

## 2024-10-09 RX ORDER — ONDANSETRON 2 MG/ML
INJECTION INTRAMUSCULAR; INTRAVENOUS
Status: DISCONTINUED | OUTPATIENT
Start: 2024-10-09 | End: 2024-10-09 | Stop reason: SDUPTHER

## 2024-10-09 RX ORDER — ROCURONIUM BROMIDE 10 MG/ML
INJECTION, SOLUTION INTRAVENOUS
Status: DISCONTINUED | OUTPATIENT
Start: 2024-10-09 | End: 2024-10-09 | Stop reason: SDUPTHER

## 2024-10-09 RX ORDER — LIDOCAINE HYDROCHLORIDE 20 MG/ML
INJECTION, SOLUTION EPIDURAL; INFILTRATION; INTRACAUDAL; PERINEURAL
Status: DISCONTINUED | OUTPATIENT
Start: 2024-10-09 | End: 2024-10-09 | Stop reason: SDUPTHER

## 2024-10-09 RX ORDER — HALOPERIDOL 5 MG/ML
1 INJECTION INTRAMUSCULAR
Status: DISCONTINUED | OUTPATIENT
Start: 2024-10-09 | End: 2024-10-09 | Stop reason: HOSPADM

## 2024-10-09 RX ORDER — NALOXONE HYDROCHLORIDE 0.4 MG/ML
INJECTION, SOLUTION INTRAMUSCULAR; INTRAVENOUS; SUBCUTANEOUS PRN
Status: DISCONTINUED | OUTPATIENT
Start: 2024-10-09 | End: 2024-10-09 | Stop reason: HOSPADM

## 2024-10-09 RX ORDER — DIPHENHYDRAMINE HYDROCHLORIDE 50 MG/ML
12.5 INJECTION INTRAMUSCULAR; INTRAVENOUS
Status: DISCONTINUED | OUTPATIENT
Start: 2024-10-09 | End: 2024-10-09 | Stop reason: HOSPADM

## 2024-10-09 RX ORDER — HEPARIN SODIUM AND DEXTROSE 5000; 5 [USP'U]/100ML; G/100ML
INJECTION INTRAVENOUS
Status: DISCONTINUED | OUTPATIENT
Start: 2024-10-09 | End: 2024-10-09 | Stop reason: SDUPTHER

## 2024-10-09 RX ORDER — LABETALOL HYDROCHLORIDE 5 MG/ML
10 INJECTION, SOLUTION INTRAVENOUS
Status: DISCONTINUED | OUTPATIENT
Start: 2024-10-09 | End: 2024-10-09 | Stop reason: HOSPADM

## 2024-10-09 RX ORDER — COLCHICINE 0.6 MG/1
0.6 TABLET ORAL DAILY
Qty: 30 TABLET | Refills: 0 | Status: SHIPPED | OUTPATIENT
Start: 2024-10-09

## 2024-10-09 RX ORDER — IPRATROPIUM BROMIDE AND ALBUTEROL SULFATE 2.5; .5 MG/3ML; MG/3ML
1 SOLUTION RESPIRATORY (INHALATION)
Status: DISCONTINUED | OUTPATIENT
Start: 2024-10-09 | End: 2024-10-09 | Stop reason: HOSPADM

## 2024-10-09 RX ORDER — DEXAMETHASONE SODIUM PHOSPHATE 4 MG/ML
INJECTION, SOLUTION INTRA-ARTICULAR; INTRALESIONAL; INTRAMUSCULAR; INTRAVENOUS; SOFT TISSUE
Status: DISCONTINUED | OUTPATIENT
Start: 2024-10-09 | End: 2024-10-09 | Stop reason: SDUPTHER

## 2024-10-09 RX ORDER — PANTOPRAZOLE SODIUM 40 MG/1
40 TABLET, DELAYED RELEASE ORAL
Qty: 60 TABLET | Refills: 0 | Status: SHIPPED | OUTPATIENT
Start: 2024-10-09

## 2024-10-09 RX ORDER — ADENOSINE 3 MG/ML
INJECTION, SOLUTION INTRAVENOUS PRN
Status: DISCONTINUED | OUTPATIENT
Start: 2024-10-09 | End: 2024-10-09 | Stop reason: HOSPADM

## 2024-10-09 RX ORDER — HYDRALAZINE HYDROCHLORIDE 20 MG/ML
10 INJECTION INTRAMUSCULAR; INTRAVENOUS
Status: DISCONTINUED | OUTPATIENT
Start: 2024-10-09 | End: 2024-10-09 | Stop reason: HOSPADM

## 2024-10-09 RX ORDER — PROCHLORPERAZINE EDISYLATE 5 MG/ML
5 INJECTION INTRAMUSCULAR; INTRAVENOUS
Status: DISCONTINUED | OUTPATIENT
Start: 2024-10-09 | End: 2024-10-09 | Stop reason: HOSPADM

## 2024-10-09 RX ADMIN — ROCURONIUM BROMIDE 20 MG: 10 INJECTION, SOLUTION INTRAVENOUS at 07:51

## 2024-10-09 RX ADMIN — HEPARIN SODIUM 9000 UNITS: 1000 INJECTION INTRAVENOUS; SUBCUTANEOUS at 07:44

## 2024-10-09 RX ADMIN — ROCURONIUM BROMIDE 10 MG: 10 INJECTION, SOLUTION INTRAVENOUS at 08:20

## 2024-10-09 RX ADMIN — HEPARIN SODIUM 9000 UNITS: 1000 INJECTION INTRAVENOUS; SUBCUTANEOUS at 07:41

## 2024-10-09 RX ADMIN — HEPARIN SODIUM AND DEXTROSE 40 UNITS/KG/HR: 5000; 5 INJECTION INTRAVENOUS at 07:44

## 2024-10-09 RX ADMIN — PROPOFOL 50 MG: 10 INJECTION, EMULSION INTRAVENOUS at 08:39

## 2024-10-09 RX ADMIN — ROCURONIUM BROMIDE 50 MG: 10 INJECTION, SOLUTION INTRAVENOUS at 07:15

## 2024-10-09 RX ADMIN — SUGAMMADEX 200 MG: 100 INJECTION, SOLUTION INTRAVENOUS at 08:41

## 2024-10-09 RX ADMIN — LIDOCAINE HYDROCHLORIDE 100 MG: 20 INJECTION, SOLUTION EPIDURAL; INFILTRATION; INTRACAUDAL; PERINEURAL at 07:15

## 2024-10-09 RX ADMIN — PROTAMINE SULFATE 100 MG: 10 INJECTION, SOLUTION INTRAVENOUS at 08:29

## 2024-10-09 RX ADMIN — SODIUM CHLORIDE, SODIUM LACTATE, POTASSIUM CHLORIDE, AND CALCIUM CHLORIDE: 600; 310; 30; 20 INJECTION, SOLUTION INTRAVENOUS at 07:15

## 2024-10-09 RX ADMIN — PROPOFOL 200 MG: 10 INJECTION, EMULSION INTRAVENOUS at 07:15

## 2024-10-09 RX ADMIN — DEXAMETHASONE SODIUM PHOSPHATE 4 MG: 4 INJECTION INTRA-ARTICULAR; INTRALESIONAL; INTRAMUSCULAR; INTRAVENOUS; SOFT TISSUE at 07:23

## 2024-10-09 RX ADMIN — ONDANSETRON 4 MG: 2 INJECTION INTRAMUSCULAR; INTRAVENOUS at 07:23

## 2024-10-09 ASSESSMENT — PAIN SCALES - GENERAL: PAINLEVEL_OUTOF10: 0

## 2024-10-09 NOTE — ANESTHESIA POSTPROCEDURE EVALUATION
Department of Anesthesiology  Postprocedure Note    Patient: Lubna Cabezas  MRN: [id number]  YOB: 1950  Date of evaluation: 10/9/2024    Procedure Summary       Date: 10/09/24 Room / Location: D EP 2 ALL EVENTS / [facility] CARDI[facility] CATH LAB    Anesthesia Start: 0705 Anesthesia Stop: 0857    Procedure: Ablation A-fib w complete ep study Diagnosis:       Persistent atrial fibrillation (HCC)      (Persistent atrial fibrillation (HCC) [I48.19])    Providers: Joey Christensen MD Responsible Provider: Juan Francisco Springer DO    Anesthesia Type: general ASA Status: 3            Anesthesia Type: No value filed.    Glenis Phase I: Glenis Score: 10    Glenis Phase II:      Anesthesia Post Evaluation    Patient location during evaluation: PACU  Level of consciousness: awake and alert  Airway patency: patent  Nausea & Vomiting: no nausea  Cardiovascular status: hemodynamically stable  Respiratory status: acceptable  Hydration status: euvolemic  Pain management: satisfactory to patient    There were no known notable events for this encounter.

## 2024-10-09 NOTE — PROGRESS NOTES
Discharge instructions given. Bilateral groin sites remain intact with tegaderm. No bleeding/oozing or hematoma noted to sites. No complaints. Family at bedside.

## 2024-10-09 NOTE — PROCEDURES
antral pulmonary veins, the ablation catheter was used to pace at high output to try to localize the right phrenic nerve and map its location prior to ablation. Adenosine was injected (6-12 mg) to demonstrate the lack of early reconnection with the Octaray in the PVs. There was no early reconnection with adenosine. The ablation catheter was inserted into the LV, documented LV electrograms and was used to pace the LV for the EP study and for rescue pacing during adenosine infusion.  Post PVI, the Octaray was used to perform a second LA voltage map post ablation to demonstrate pulmonary vein isolation and post ablation voltage as pictured below.     Baseline LA Voltage Map      Post Ablation LA Voltage Map      Cavotricuspid Isthmus ablation (right atrial flutter)  Linear ablation across the cavo-tricuspid isthmus was performed starting with 1:2 A:V EGM’s along the isthmus at 6pm Serbian.  The local electrogram activation sequence, differential pacing maneuvers and electrogram timing was used to demonstrate bidirectional block along the cavotricuspid isthmus.     Post-Ablation trans-isthmus time: 176 msec    Next, baseline recordings and a diagnostic EP study was performed.    The coronary sinus multipolar catheter was used to pace the left atrium during the EP study. The LA CS electrograms were documented and interpreted during the procedure. Ventricular pacing revealed no retrograde conduction.    At the completion of the ablation and EPS, all long sheaths were exchanged for short 8 Fr sheaths and 100 units of Protamine was delivered to reverse the effect of heparin.      Four VASCADE MVP devices were used to close the venotomy sites. The MVP tools were advanced into the 8 Fr and 10 Fr sheaths, respectively; the sheaths were then removed. The collagen was then deployed and a 30 second dwell time was performed to allow for expansion of the collagen. The delivery tools were then removed with adequate hemostasis.     The

## 2024-10-09 NOTE — DISCHARGE INSTRUCTIONS
Ablation Discharge Instructions    *Check the puncture site frequently for swelling or bleeding. If you see any bleeding, lie down and apply pressure over the area with a clean town or washcloth. Notify your doctor for any redness, swelling, drainage or oozing from the puncture site. Notify your doctor for any fever or chills.    *If the leg with the puncture becomes cold, numb or painful, call [facility]iology at  [phone].    *Activity should be limited for the next 48 hours. Climb stairs as little as possible and avoid any stooping, bending or strenuous activity for 48 hours. No heavy lifting (anything over 10 pounds) for three days.    *Do not drive for 48 hours.    *You may resume your usual diet. Drink more fluids than usual.    *Have a responsible person drive you home and stay with you for at least 24 hours after your ablation.    *You may remove the bandage from your Right, Left Groin in 24 hours. You may shower in 24 hours. No tub baths, hot tubs or swimming for one week. Do not place any lotions, creams, powders, ointments over the puncture site for one week. You may place a clean band-aid over the puncture site each day for 5 days. Change this daily.      Sedation for a Medical Procedure: Care Instructions     You were given a sedative medication during your visit. While many of the effects will have worn   off before you leave; you may continue to feel some effects for several hours.      Common side effects from sedation include:  Feeling sleepy. (Your doctors and nurses will make sure you are not too sleepy to go home.)  Nausea and vomiting. This usually does not last long.  Feeling tired.     How can you care for yourself at home?  Activity    Don't do anything for 24 hours that requires attention to detail. It takes time for the medicine effects to completely wear off.     Do not make important legal decisions for 24 hours.     Do not sign any legal

## 2024-10-09 NOTE — PERIOP NOTE
TRANSFER - OUT REPORT:    Verbal report given to cath lab RN on Lubna Cabezas  being transferred to Cath Lab recovery for routine post-op       Report consisted of patient’s Situation, Background, Assessment and   Recommendations(SBAR).     Information from the following report(s) Nurse Handoff Report, Adult Overview, Surgery Report, Recent Results, Cardiac Rhythm Sinus Rhythm, Neuro Assessment, and Event Log was reviewed with the receiving nurse.    Lines:   Peripheral IV 10/08/24 Left;Posterior Hand (Active)   Site Assessment Clean, dry & intact 10/09/24 0858   Line Status Flushed 10/09/24 0858   Line Care Connections checked and tightened 10/09/24 0858   Phlebitis Assessment No symptoms 10/09/24 0858   Infiltration Assessment 0 10/09/24 0858   Alcohol Cap Used No 10/09/24 0858   Dressing Status Clean, dry & intact 10/09/24 0858   Dressing Type Transparent 10/09/24 0858       Peripheral IV 10/09/24 Posterior;Right Wrist (Active)   Site Assessment Clean, dry & intact 10/09/24 0858   Line Status Flushed 10/09/24 0858   Line Care Connections checked and tightened 10/09/24 0858   Phlebitis Assessment No symptoms 10/09/24 0858   Infiltration Assessment 0 10/09/24 0858   Alcohol Cap Used No 10/09/24 0858   Dressing Status Clean, dry & intact 10/09/24 0858   Dressing Type Transparent 10/09/24 0858        Opportunity for questions and clarification was provided.      Patient transported with:   Registered Nurse    VTE prophylaxis orders have been written for Lubna Cabezas.

## 2024-10-09 NOTE — PROGRESS NOTES
Assisted to ambulate in hallway with no oozing/bleeding or hematoma noted to bilateral groin sites. No complaints.   n/a

## 2024-10-09 NOTE — ANESTHESIA PRE PROCEDURE
Department of Anesthesiology  Preprocedure Note       Name:  Lubna Cabezas   Age:  73 y.o.  :  1950                                          MRN:  [id number]         Date:  10/9/2024      Surgeon: Surgeon(s):  Joey Christensen MD    Procedure: Procedure(s):  Ablation A-fib w complete ep study    Medications prior to admission:   Prior to Admission medications    Medication Sig Start Date End Date Taking? Authorizing Provider   apixaban (ELIQUIS) 5 MG TABS tablet Take 1 tablet by mouth 2 times daily 24  Yes Joey Church MD   furosemide (LASIX) 40 MG tablet Take one tablet daily as needed for fluid 24   Joey Church MD   potassium chloride (KLOR-CON) 10 MEQ extended release tablet Take 1 tablet by mouth daily 24   Joey Church MD   omeprazole (PRILOSEC) 40 MG delayed release capsule Take 1 capsule by mouth daily 24   Shawna Wu, APRN - NP   losartan (COZAAR) 50 MG tablet Take 1 tablet by mouth daily 24   Joey Church MD   metoprolol succinate (TOPROL XL) 100 MG extended release tablet TAKE 1 (ONE) TABLET BY MOUTH EVERY DAY 24   Joey Church MD   BREO ELLIPTA 100-25 MCG/ACT inhaler Inhale 1 puff into the lungs daily 1/3/24   Stefania Penny MD   cetirizine (ZYRTEC) 10 MG tablet Take 1 tablet by mouth daily 1/3/24   Stefania Penny MD   montelukast (SINGULAIR) 10 MG tablet Take 1 tablet by mouth daily 1/3/24   Stefania Penny MD   azelastine (ASTELIN) 0.1 % nasal spray 1 spray by Nasal route 2 times daily Use in each nostril as directed 1/3/24   Stefania Penny MD   fluticasone (FLONASE) 50 MCG/ACT nasal spray 1 spray by Each Nostril route daily 1/3/24   Stefania Penny MD   magnesium oxide (MAG-OX) 400 (240 Mg) MG tablet TAKE 1 (ONE) TABLET TWICE DAILY. 10/12/23   Joey Church MD   albuterol sulfate HFA (PROVENTIL;VENTOLIN;PROAIR) 108 (90 Base) MCG/ACT inhaler Inhale 2 puffs every 6 (six) hours as needed for wheezing or shortness

## 2024-10-09 NOTE — PROGRESS NOTES
Report received from PACU RN for continued care following A-Fib ablation. Bilateral groin sites intact with no oozing or hematoma noted. Instructed to keep both legs straight. C/o headache on arrival. Family at bedside.

## 2024-10-09 NOTE — H&P
[facility] Cardiology/Electrophyiology Consult                Date of  Admission: 10/9/2024  6:02 AM     Lubna Cabezas is a 73 y.o. female admitted for Persistent atrial fibrillation (HCC) [I48.19].      73 y.o. female with a past medical and cardiac history significant for HTN, persistent atrial fibrillation s/p recent DCCV and presents for scheduled ablation for afib. She had about 4 weeks of afib last month, symptoms of palpitations, irregular HR, fatigue, VITAL, low energy. She had DCCV and has been feeling better back in NSR.     Cardiac PMH: (Old records have been reviewed and summarized below)    Patient Active Problem List   Diagnosis    Chronic systolic congestive heart failure (HCC)    PVC's (premature ventricular contractions)    Primary hypertension    Severe obesity    Localized edema    Stress incontinence, female    Asthma    Gastroesophageal reflux disease without esophagitis    Rotator cuff impingement syndrome of left shoulder    Acute bacterial sinusitis    Left acute otitis media    Anemia    Need for RSV vaccination    Need for shingles vaccine    Paroxysmal atrial fibrillation (HCC)       Past Medical History:   Diagnosis Date    Arthritis     osteo    Asthma     daily inhaler-- followed by dr howard    CHF (congestive heart failure) (HCC)     last echo -- EF 50-55% followed by dr negron    GERD (gastroesophageal reflux disease)     controlled with med    Hypertension     controlled with med    Urinary incontinence       Past Surgical History:   Procedure Laterality Date    ABDOMEN SURGERY  1970    WT LOSS SURG     SECTION      x3    HYSTERECTOMY (CERVIX STATUS UNKNOWN)      UROLOGICAL SURGERY      bladder tac    VAGINA SURGERY N/A 2023    OBTRYX TRANSOBTURATOR VAGINAL SLING performed by Ephraim Gill Jr., MD at [facility] MAIN OR     Allergies   Allergen Reactions    Amoxicillin Diarrhea      Family History   Problem Relation Age of Onset    High Blood Pressure Mother     High  [Follow-Up: _____] : a [unfilled] follow-up visit

## 2024-11-05 ENCOUNTER — TELEPHONE (OUTPATIENT)
Age: 74
End: 2024-11-05

## 2024-11-05 DIAGNOSIS — I50.22 CHRONIC SYSTOLIC (CONGESTIVE) HEART FAILURE (HCC): ICD-10-CM

## 2024-11-05 NOTE — TELEPHONE ENCOUNTER
Requested Prescriptions     Pending Prescriptions Disp Refills    magnesium oxide (MAG-OX) 400 (240 Mg) MG tablet 90 tablet 3     Sig: Take 1 tablet by mouth daily      Pharmacy verified. Erx as requested.

## 2024-11-05 NOTE — TELEPHONE ENCOUNTER
MEDICATION REFILL REQUEST      Name of Medication:  magnesium  Dose:  400 mg  Frequency:  twice a day   Quantity:    Days' supply:  90 day       Pharmacy Name/Location:  did not leave/ please call in today because patient is totally out

## 2024-11-06 RX ORDER — LANOLIN ALCOHOL/MO/W.PET/CERES
400 CREAM (GRAM) TOPICAL DAILY
Qty: 90 TABLET | Refills: 3 | Status: SHIPPED | OUTPATIENT
Start: 2024-11-06

## 2024-11-07 ENCOUNTER — TELEPHONE (OUTPATIENT)
Age: 74
End: 2024-11-07

## 2024-11-30 ENCOUNTER — TELEPHONE (OUTPATIENT)
Age: 74
End: 2024-11-30

## 2024-11-30 DIAGNOSIS — I50.22 CHRONIC SYSTOLIC (CONGESTIVE) HEART FAILURE (HCC): ICD-10-CM

## 2024-11-30 DIAGNOSIS — I50.22 CHRONIC SYSTOLIC CONGESTIVE HEART FAILURE (HCC): ICD-10-CM

## 2024-11-30 DIAGNOSIS — I10 PRIMARY HYPERTENSION: ICD-10-CM

## 2024-12-02 RX ORDER — LANOLIN ALCOHOL/MO/W.PET/CERES
400 CREAM (GRAM) TOPICAL 2 TIMES DAILY
Qty: 180 TABLET | Refills: 3 | Status: SHIPPED | OUTPATIENT
Start: 2024-12-02

## 2024-12-02 RX ORDER — METOPROLOL SUCCINATE 100 MG/1
100 TABLET, EXTENDED RELEASE ORAL DAILY
Qty: 90 TABLET | Refills: 3 | Status: SHIPPED | OUTPATIENT
Start: 2024-12-02

## 2024-12-02 RX ORDER — APIXABAN 5 MG/1
5 TABLET, FILM COATED ORAL 2 TIMES DAILY
Qty: 180 TABLET | Refills: 3 | Status: SHIPPED | OUTPATIENT
Start: 2024-12-02

## 2024-12-02 NOTE — TELEPHONE ENCOUNTER
Patient returned call, she is taking Eliquis 5 mg and gets filled at [facility]. Also needed new prescriptions on magnesium taking bid and metoprolol. Prescriptions sent to pharmacy[facility]  Requested Prescriptions     Signed Prescriptions Disp Refills    ELIQUIS 5 MG TABS tablet 180 tablet 3     Sig: Take 1 tablet by mouth 2 times daily.     Authorizing Provider: JOSÉ MIGUEL DELUCA     Ordering User: IGOR STEVE    magnesium oxide (MAG-OX) 400 (240 Mg) MG tablet 180 tablet 3     Sig: Take 1 tablet by mouth 2 times daily     Authorizing Provider: JOSÉ MIGUEL DELUCA     Ordering User: IGOR STEVE    metoprolol succinate (TOPROL XL) 100 MG extended release tablet 90 tablet 3     Sig: Take 1 tablet by mouth daily     Authorizing Provider: JOSÉ MIGUEL DELUCA     Ordering User: IGOR STEVE

## 2024-12-02 NOTE — TELEPHONE ENCOUNTER
Prescription sent to [facility]  Requested Prescriptions     Pending Prescriptions Disp Refills    apixaban (ELIQUIS) 5 MG TABS tablet [Pharmacy Med Name: Eliquis 5 mg tablet] 180 tablet 3     Sig: Take 1 tablet by mouth 2 times daily.

## 2025-02-07 DIAGNOSIS — J45.40 MODERATE PERSISTENT ASTHMA, UNSPECIFIED WHETHER COMPLICATED: ICD-10-CM

## 2025-02-07 DIAGNOSIS — I10 PRIMARY HYPERTENSION: ICD-10-CM

## 2025-02-07 DIAGNOSIS — J30.9 ALLERGIC RHINITIS, UNSPECIFIED SEASONALITY, UNSPECIFIED TRIGGER: ICD-10-CM

## 2025-02-07 DIAGNOSIS — R05.3 CHRONIC COUGH: ICD-10-CM

## 2025-02-07 RX ORDER — SPIRONOLACTONE 25 MG/1
25 TABLET ORAL DAILY
Qty: 90 TABLET | Refills: 3 | Status: SHIPPED | OUTPATIENT
Start: 2025-02-07

## 2025-02-07 NOTE — TELEPHONE ENCOUNTER
Prescription sent to [facility]  Requested Prescriptions     Signed Prescriptions Disp Refills    spironolactone (ALDACTONE) 25 MG tablet 90 tablet 3     Sig: Take 1 tablet by mouth daily     Authorizing Provider: JOSÉ MIGUEL DELUCA     Ordering User: IGOR STEVE

## 2025-02-11 ENCOUNTER — TELEPHONE (OUTPATIENT)
Dept: PULMONOLOGY | Age: 75
End: 2025-02-11

## 2025-02-11 RX ORDER — MONTELUKAST SODIUM 10 MG/1
10 TABLET ORAL DAILY
Qty: 90 TABLET | Refills: 0 | Status: SHIPPED | OUTPATIENT
Start: 2025-02-11

## 2025-02-11 RX ORDER — FLUTICASONE FUROATE AND VILANTEROL TRIFENATATE 100; 25 UG/1; UG/1
1 POWDER RESPIRATORY (INHALATION) DAILY
Qty: 1 EACH | Refills: 0 | Status: SHIPPED | OUTPATIENT
Start: 2025-02-11

## 2025-02-11 NOTE — TELEPHONE ENCOUNTER
Refilled meds for 1 month, patient was supposed to f/u in Jan 25.  Please get scheduled and list in appt notes needs refills of meds.

## 2025-02-17 NOTE — PROGRESS NOTES
Patient Name:  Lubna Cabezas                               YOB: 1950  MRN: [id number]                                                Office Visit 2/18/2025    ASSESSMENT AND PLAN:  (Medical Decision Making)        1. Moderate persistent asthma, unspecified whether complicated  Improved and will do a trial off of breo.  - montelukast (SINGULAIR) 10 MG tablet; Take 1 tablet by mouth daily  Dispense: 90 tablet; Refill: 0  - albuterol sulfate HFA (PROVENTIL;VENTOLIN;PROAIR) 108 (90 Base) MCG/ACT inhaler; Inhale 2 puffs every 6 (six) hours as needed for wheezing or shortness of breath.  Dispense: 1 each; Refill: 11  - Spirometry Without Bronchodilator  - AMB POC EXHALED NITRIC OXIDE    2. Allergic rhinitis, unspecified seasonality, unspecified trigger  S/p sinuplasty with significant improvement.  Continue measures per ENT  - BREO ELLIPTA 100-25 MCG/ACT inhaler; Inhale 1 puff into the lungs daily  Dispense: 1 each; Refill: 0  - cetirizine (ZYRTEC) 10 MG tablet; Take 1 tablet by mouth daily  Dispense: 90 tablet; Refill: 3  - montelukast (SINGULAIR) 10 MG tablet; Take 1 tablet by mouth daily  Dispense: 90 tablet; Refill: 0    3. Shortness of breath  Chronic, though asthma appears well-controlled currently.   - albuterol sulfate HFA (PROVENTIL;VENTOLIN;PROAIR) 108 (90 Base) MCG/ACT inhaler; Inhale 2 puffs every 6 (six) hours as needed for wheezing or shortness of breath.  Dispense: 1 each; Refill: 11      Follow-up and Dispositions    Return in about 6 months (around 8/18/2025) for with anita or Brianda.       Stefania Penny MD    Total time for encounter on day of encounter was 30 minutes.  This time includes chart prep, review of tests/procedures, review of other provider's notes, documentation and counseling patient regarding disease process and medications.       ____________________________________________    REASON FOR VISIT:   Chief Complaint   Patient presents with    Cough    Asthma    Hoarse

## 2025-02-18 ENCOUNTER — OFFICE VISIT (OUTPATIENT)
Dept: PULMONOLOGY | Age: 75
End: 2025-02-18
Payer: COMMERCIAL

## 2025-02-18 VITALS
BODY MASS INDEX: 38.74 KG/M2 | SYSTOLIC BLOOD PRESSURE: 138 MMHG | HEART RATE: 80 BPM | RESPIRATION RATE: 16 BRPM | OXYGEN SATURATION: 95 % | DIASTOLIC BLOOD PRESSURE: 56 MMHG | WEIGHT: 240 LBS

## 2025-02-18 DIAGNOSIS — J45.40 MODERATE PERSISTENT ASTHMA, UNSPECIFIED WHETHER COMPLICATED: Primary | ICD-10-CM

## 2025-02-18 DIAGNOSIS — J30.9 ALLERGIC RHINITIS, UNSPECIFIED SEASONALITY, UNSPECIFIED TRIGGER: ICD-10-CM

## 2025-02-18 DIAGNOSIS — R06.02 SHORTNESS OF BREATH: ICD-10-CM

## 2025-02-18 LAB
EXPIRATORY TIME: NORMAL
FEF 25-75% %PRED-PRE: NORMAL
FEF 25-75% PRED: NORMAL
FEF 25-75-PRE: NORMAL
FENO: 25 PPB
FEV1 %PRED-PRE: NORMAL %
FEV1 PRED: 2.23 L
FEV1/FVC %PRED-PRE: NORMAL
FEV1/FVC PRED: NORMAL
FEV1/FVC: 0.8 %
FEV1: 1.96 L
FVC %PRED-PRE: NORMAL %
FVC PRED: 2.91 L
FVC: 2.45 L
PEF %PRED-PRE: NORMAL
PEF PRED: NORMAL
PEF-PRE: NORMAL

## 2025-02-18 PROCEDURE — 95012 NITRIC OXIDE EXP GAS DETER: CPT | Performed by: INTERNAL MEDICINE

## 2025-02-18 PROCEDURE — 94010 BREATHING CAPACITY TEST: CPT | Performed by: INTERNAL MEDICINE

## 2025-02-18 PROCEDURE — 99214 OFFICE O/P EST MOD 30 MIN: CPT | Performed by: INTERNAL MEDICINE

## 2025-02-18 PROCEDURE — 3075F SYST BP GE 130 - 139MM HG: CPT | Performed by: INTERNAL MEDICINE

## 2025-02-18 PROCEDURE — 1123F ACP DISCUSS/DSCN MKR DOCD: CPT | Performed by: INTERNAL MEDICINE

## 2025-02-18 PROCEDURE — 3078F DIAST BP <80 MM HG: CPT | Performed by: INTERNAL MEDICINE

## 2025-02-18 RX ORDER — MONTELUKAST SODIUM 10 MG/1
10 TABLET ORAL DAILY
Qty: 90 TABLET | Refills: 0 | Status: SHIPPED | OUTPATIENT
Start: 2025-02-18

## 2025-02-18 RX ORDER — ALBUTEROL SULFATE 90 UG/1
INHALANT RESPIRATORY (INHALATION)
Qty: 1 EACH | Refills: 11 | Status: SHIPPED | OUTPATIENT
Start: 2025-02-18

## 2025-02-18 RX ORDER — CETIRIZINE HYDROCHLORIDE 10 MG/1
10 TABLET ORAL DAILY
Qty: 90 TABLET | Refills: 3 | Status: SHIPPED | OUTPATIENT
Start: 2025-02-18

## 2025-02-18 RX ORDER — FLUTICASONE FUROATE AND VILANTEROL TRIFENATATE 100; 25 UG/1; UG/1
1 POWDER RESPIRATORY (INHALATION) DAILY
Qty: 1 EACH | Refills: 0 | Status: SHIPPED | OUTPATIENT
Start: 2025-02-18

## 2025-02-18 ASSESSMENT — PULMONARY FUNCTION TESTS
FEV1_PREDICTED: 2.23
FEV1/FVC: 0.80
FVC_PREDICTED: 2.91
FENO: 25
FEV1: 1.96
FVC: 2.45

## 2025-02-20 ENCOUNTER — OFFICE VISIT (OUTPATIENT)
Age: 75
End: 2025-02-20
Payer: COMMERCIAL

## 2025-02-20 VITALS
WEIGHT: 241.8 LBS | BODY MASS INDEX: 40.29 KG/M2 | HEIGHT: 65 IN | HEART RATE: 76 BPM | SYSTOLIC BLOOD PRESSURE: 118 MMHG | DIASTOLIC BLOOD PRESSURE: 72 MMHG

## 2025-02-20 DIAGNOSIS — I48.0 PAROXYSMAL ATRIAL FIBRILLATION (HCC): Primary | ICD-10-CM

## 2025-02-20 PROCEDURE — 3074F SYST BP LT 130 MM HG: CPT | Performed by: INTERNAL MEDICINE

## 2025-02-20 PROCEDURE — 1123F ACP DISCUSS/DSCN MKR DOCD: CPT | Performed by: INTERNAL MEDICINE

## 2025-02-20 PROCEDURE — 93000 ELECTROCARDIOGRAM COMPLETE: CPT | Performed by: INTERNAL MEDICINE

## 2025-02-20 PROCEDURE — 3078F DIAST BP <80 MM HG: CPT | Performed by: INTERNAL MEDICINE

## 2025-02-20 PROCEDURE — 99214 OFFICE O/P EST MOD 30 MIN: CPT | Performed by: INTERNAL MEDICINE

## 2025-02-20 NOTE — PROGRESS NOTES
[facility] CARDIOLOGY, [address], [address]  [place] [address]  PHONE: [phone]  Lubna Cabezas  1950    Chief Complant:    Chief Complaint   Patient presents with    Atrial Fibrillation      Consultation is requested by [unfilled] for evaluation of Atrial Fibrillation    Reason for Consultation: afib    History:  Lubna Cabezas is a very pleasant 74 y.o. female with a past medical and cardiac history significant for HTN, persistent atrial fibrillation s/p recent DCCV and presents s/p afib ablation for follow up. She has been doing well, but did have episode of rapid HR lasting about 6 minutes last night. Otherwise, no cardiac complaints.     Cardiac PMH: (Old records have been reviewed and summarized below)  MANDO/DCCV (6/10/24): EF 50-55%. Mild MR/TR. Mild LAE. Successful cardioversion.   Monitor (5/9/24) personally viewed and interpreted: persistent afib with RVR av , symptom events correlated with afib    Reviewed office note Dr. Penny 2/18/25    Past Medical History, Past Surgical History, Family history, Social History, and Medications were all reviewed with the patient today and updated as necessary.     Current Outpatient Medications   Medication Sig Dispense Refill    cetirizine (ZYRTEC) 10 MG tablet Take 1 tablet by mouth daily 90 tablet 3    montelukast (SINGULAIR) 10 MG tablet Take 1 tablet by mouth daily 90 tablet 0    albuterol sulfate HFA (PROVENTIL;VENTOLIN;PROAIR) 108 (90 Base) MCG/ACT inhaler Inhale 2 puffs every 6 (six) hours as needed for wheezing or shortness of breath. 1 each 11    spironolactone (ALDACTONE) 25 MG tablet Take 1 tablet by mouth daily 90 tablet 3    ELIQUIS 5 MG TABS tablet Take 1 tablet by mouth 2 times daily. 180 tablet 3    magnesium oxide (MAG-OX) 400 (240 Mg) MG tablet Take 1 tablet by mouth 2 times daily 180 tablet 3    metoprolol succinate (TOPROL XL) 100 MG extended release tablet Take 1 tablet by mouth daily 90 tablet 3    furosemide (LASIX) 40

## 2025-03-07 NOTE — PROGRESS NOTES
Patient received to CPRU room # 9  Ambulatory from [facility]. Patient scheduled for AfibABlation today with Dr Christensen. Procedure reviewed & questions answered, voiced good understanding consent obtained & placed on chart. All medications and medical history reviewed. Will prep patient per orders. Patient & family updated on plan of care.      The patient has a fraility score of 3-MANAGING WELL, based on patient A&Ox3, patient able to ambulate to room without difficulty.   Render Post-Care Instructions In Note?: no Detail Level: Detailed Show Applicator Variable?: Yes Duration Of Freeze Thaw-Cycle (Seconds): 3 Post-Care Instructions: I reviewed with the patient in detail post-care instructions. Patient is to wear sunprotection, and avoid picking at any of the treated lesions. Pt may apply Vaseline to crusted or scabbing areas. Consent: The patient's consent was obtained including but not limited to risks of crusting, scabbing, blistering, scarring, darker or lighter pigmentary change, recurrence, incomplete removal and infection.

## 2025-03-12 ENCOUNTER — OFFICE VISIT (OUTPATIENT)
Age: 75
End: 2025-03-12
Payer: COMMERCIAL

## 2025-03-12 VITALS
WEIGHT: 241 LBS | DIASTOLIC BLOOD PRESSURE: 78 MMHG | HEART RATE: 78 BPM | HEIGHT: 65 IN | BODY MASS INDEX: 40.15 KG/M2 | SYSTOLIC BLOOD PRESSURE: 132 MMHG

## 2025-03-12 DIAGNOSIS — I10 PRIMARY HYPERTENSION: Primary | ICD-10-CM

## 2025-03-12 DIAGNOSIS — E66.01 SEVERE OBESITY: ICD-10-CM

## 2025-03-12 DIAGNOSIS — I50.22 CHRONIC SYSTOLIC CONGESTIVE HEART FAILURE (HCC): ICD-10-CM

## 2025-03-12 DIAGNOSIS — I48.0 PAROXYSMAL ATRIAL FIBRILLATION (HCC): ICD-10-CM

## 2025-03-12 PROBLEM — H66.92 LEFT ACUTE OTITIS MEDIA: Status: RESOLVED | Noted: 2024-05-21 | Resolved: 2025-03-12

## 2025-03-12 PROBLEM — J01.90 ACUTE BACTERIAL SINUSITIS: Status: RESOLVED | Noted: 2024-05-21 | Resolved: 2025-03-12

## 2025-03-12 PROBLEM — B96.89 ACUTE BACTERIAL SINUSITIS: Status: RESOLVED | Noted: 2024-05-21 | Resolved: 2025-03-12

## 2025-03-12 PROCEDURE — 99214 OFFICE O/P EST MOD 30 MIN: CPT | Performed by: INTERNAL MEDICINE

## 2025-03-12 PROCEDURE — 3078F DIAST BP <80 MM HG: CPT | Performed by: INTERNAL MEDICINE

## 2025-03-12 PROCEDURE — 1123F ACP DISCUSS/DSCN MKR DOCD: CPT | Performed by: INTERNAL MEDICINE

## 2025-03-12 PROCEDURE — 3075F SYST BP GE 130 - 139MM HG: CPT | Performed by: INTERNAL MEDICINE

## 2025-03-12 PROCEDURE — 93000 ELECTROCARDIOGRAM COMPLETE: CPT | Performed by: INTERNAL MEDICINE

## 2025-03-12 RX ORDER — POTASSIUM CHLORIDE 750 MG/1
10 TABLET, EXTENDED RELEASE ORAL DAILY
Qty: 90 TABLET | Refills: 3 | Status: SHIPPED | OUTPATIENT
Start: 2025-03-12

## 2025-03-12 RX ORDER — METOPROLOL SUCCINATE 100 MG/1
100 TABLET, EXTENDED RELEASE ORAL DAILY
Qty: 90 TABLET | Refills: 3 | Status: SHIPPED | OUTPATIENT
Start: 2025-03-12

## 2025-03-12 RX ORDER — LOSARTAN POTASSIUM 50 MG/1
50 TABLET ORAL DAILY
Qty: 90 TABLET | Refills: 3 | Status: SHIPPED | OUTPATIENT
Start: 2025-03-12

## 2025-03-12 RX ORDER — SPIRONOLACTONE 25 MG/1
25 TABLET ORAL DAILY
Qty: 90 TABLET | Refills: 3 | Status: SHIPPED | OUTPATIENT
Start: 2025-03-12

## 2025-03-12 RX ORDER — LANOLIN ALCOHOL/MO/W.PET/CERES
400 CREAM (GRAM) TOPICAL 2 TIMES DAILY
Qty: 180 TABLET | Refills: 3 | Status: SHIPPED | OUTPATIENT
Start: 2025-03-12

## 2025-03-12 ASSESSMENT — ENCOUNTER SYMPTOMS: SHORTNESS OF BREATH: 0

## 2025-03-12 NOTE — PROGRESS NOTES
[address], [address]  [place] [address]  PHONE: [phone]    Lubna Cabezas  1950      SUBJECTIVE:   Lubna Cabezas is a 74 y.o. female seen for a follow up visit regarding the following:     Chief Complaint   Patient presents with    Atrial Fibrillation       HPI:    Lubna Cabezas presents for routine follow up. Multiple issues addressed.     CHRONIC SYSTOLIC HEART FAILURE/RECOVERED EF  Status:  Had MANDO with afib ablation on 10/9/24.  EF 50-55%.   Takes lasix 2-3 times a month.  Stable dyspnea on exertion.  No PND or orthopnea  Medications:    B-Blocker: Toprol  mg daily  ACE/ARB/ARNI: Losartan 50 mg daily.  MRA: Aldactone 25 mg daily.  SGLT2i: None.  Diuretic: Lasix 40 mg daily as needed.  Prior History:       Echo (11/5/2021): EF 40-45%.  Mild LAE.  Moderate MR.  Mild TR.  RVSP 27    LHC (12/27/21):  EF 45-50%.   Normal coronary arteries.    Echo (12/22/2022): EF 50-55%.  Mild LVH.  Normal diastolic function.  Mild MR/TR.  RVSP 26.  Ascending aorta 3.6 cm   Echo (1/10/24):  Low normal EF.  Normal diastolic function.  Mild/moderate MR.LAE.      Paroxysmal Atrial Fibrillation  Status:  Patient had atrial fibrillation ablation in October.  Recently seen in February by Dr. Christensen and reportedly was doing well with infrequent symptoms.  His note was reviewed.  Plans to continue anticoagulation and metoprolol. Patient notes some palpitations but no recent sustained arrhythmias.   Medications: Toprol- mg daily.  Eliquis 5 mg twice daily  Prior History:     MANDO/DCCV (6/10/24): EF 50-55%. Mild MR/TR. Mild LAE. Successful cardioversion.   Atrial fibrillation ablation (10/9/24):  Dr. Christensen.      Hypertension  Status:  Ambulatory BP readings have been controlled.  Patient reports compliance with medical therapy without side effects.    Medications: See CHF Medications.   Labs: Labs in October with potassium 4.0 and creatinine 0.98    Obesity  Status:  Unchanged.

## 2025-05-06 SDOH — HEALTH STABILITY: PHYSICAL HEALTH: ON AVERAGE, HOW MANY MINUTES DO YOU ENGAGE IN EXERCISE AT THIS LEVEL?: 0 MIN

## 2025-05-06 SDOH — HEALTH STABILITY: PHYSICAL HEALTH: ON AVERAGE, HOW MANY DAYS PER WEEK DO YOU ENGAGE IN MODERATE TO STRENUOUS EXERCISE (LIKE A BRISK WALK)?: 1 DAY

## 2025-05-09 ENCOUNTER — OFFICE VISIT (OUTPATIENT)
Dept: INTERNAL MEDICINE CLINIC | Facility: CLINIC | Age: 75
End: 2025-05-09
Payer: COMMERCIAL

## 2025-05-09 VITALS
DIASTOLIC BLOOD PRESSURE: 69 MMHG | HEIGHT: 65 IN | HEART RATE: 84 BPM | BODY MASS INDEX: 39.72 KG/M2 | WEIGHT: 238.4 LBS | TEMPERATURE: 97.6 F | OXYGEN SATURATION: 93 % | SYSTOLIC BLOOD PRESSURE: 131 MMHG

## 2025-05-09 DIAGNOSIS — E66.01 SEVERE OBESITY (HCC): ICD-10-CM

## 2025-05-09 DIAGNOSIS — Z13.29 SCREENING FOR THYROID DISORDER: ICD-10-CM

## 2025-05-09 DIAGNOSIS — G89.29 NECK PAIN, CHRONIC: ICD-10-CM

## 2025-05-09 DIAGNOSIS — I48.0 PAROXYSMAL ATRIAL FIBRILLATION (HCC): ICD-10-CM

## 2025-05-09 DIAGNOSIS — R20.0 NUMBNESS AND TINGLING OF HAND: ICD-10-CM

## 2025-05-09 DIAGNOSIS — I10 PRIMARY HYPERTENSION: ICD-10-CM

## 2025-05-09 DIAGNOSIS — Z13.1 ENCOUNTER FOR SCREENING FOR DIABETES MELLITUS: ICD-10-CM

## 2025-05-09 DIAGNOSIS — E55.9 HYPOVITAMINOSIS D: ICD-10-CM

## 2025-05-09 DIAGNOSIS — E78.5 DYSLIPIDEMIA: ICD-10-CM

## 2025-05-09 DIAGNOSIS — Z78.0 POST-MENOPAUSAL: ICD-10-CM

## 2025-05-09 DIAGNOSIS — R10.9 LEFT FLANK PAIN: ICD-10-CM

## 2025-05-09 DIAGNOSIS — R20.2 NUMBNESS AND TINGLING OF HAND: ICD-10-CM

## 2025-05-09 DIAGNOSIS — J45.40 MODERATE PERSISTENT ASTHMA WITHOUT COMPLICATION: ICD-10-CM

## 2025-05-09 DIAGNOSIS — Z76.89 ENCOUNTER TO ESTABLISH CARE WITH NEW PROVIDER: Primary | ICD-10-CM

## 2025-05-09 DIAGNOSIS — E83.42 HYPOMAGNESEMIA: ICD-10-CM

## 2025-05-09 DIAGNOSIS — I50.22 CHRONIC SYSTOLIC CONGESTIVE HEART FAILURE (HCC): ICD-10-CM

## 2025-05-09 DIAGNOSIS — M54.2 NECK PAIN, CHRONIC: ICD-10-CM

## 2025-05-09 DIAGNOSIS — G44.86 CERVICOGENIC HEADACHE: ICD-10-CM

## 2025-05-09 LAB
25(OH)D3 SERPL-MCNC: 12.5 NG/ML (ref 30–100)
ALBUMIN SERPL-MCNC: 3.7 G/DL (ref 3.2–4.6)
ALBUMIN/GLOB SERPL: 1.1 (ref 1–1.9)
ALP SERPL-CCNC: 94 U/L (ref 35–104)
ALT SERPL-CCNC: 25 U/L (ref 8–45)
ANION GAP SERPL CALC-SCNC: 11 MMOL/L (ref 7–16)
APPEARANCE UR: NORMAL
AST SERPL-CCNC: 32 U/L (ref 15–37)
BASOPHILS # BLD: 0.05 K/UL (ref 0–0.2)
BASOPHILS NFR BLD: 0.6 % (ref 0–2)
BILIRUB SERPL-MCNC: 0.3 MG/DL (ref 0–1.2)
BILIRUB UR QL: NEGATIVE
BUN SERPL-MCNC: 13 MG/DL (ref 8–23)
CALCIUM SERPL-MCNC: 9.3 MG/DL (ref 8.8–10.2)
CHLORIDE SERPL-SCNC: 105 MMOL/L (ref 98–107)
CHOLEST SERPL-MCNC: 197 MG/DL (ref 0–200)
CO2 SERPL-SCNC: 25 MMOL/L (ref 20–29)
COLOR UR: NORMAL
CREAT SERPL-MCNC: 0.86 MG/DL (ref 0.6–1.1)
DIFFERENTIAL METHOD BLD: ABNORMAL
EOSINOPHIL # BLD: 0.29 K/UL (ref 0–0.8)
EOSINOPHIL NFR BLD: 3.5 % (ref 0.5–7.8)
ERYTHROCYTE [DISTWIDTH] IN BLOOD BY AUTOMATED COUNT: 14.7 % (ref 11.9–14.6)
EST. AVERAGE GLUCOSE BLD GHB EST-MCNC: 118 MG/DL
GLOBULIN SER CALC-MCNC: 3.4 G/DL (ref 2.3–3.5)
GLUCOSE SERPL-MCNC: 95 MG/DL (ref 70–99)
GLUCOSE UR STRIP.AUTO-MCNC: NEGATIVE MG/DL
HBA1C MFR BLD: 5.7 % (ref 0–5.6)
HCT VFR BLD AUTO: 35.7 % (ref 35.8–46.3)
HDLC SERPL-MCNC: 35 MG/DL (ref 40–60)
HDLC SERPL: 5.7 (ref 0–5)
HGB BLD-MCNC: 10.9 G/DL (ref 11.7–15.4)
HGB UR QL STRIP: NEGATIVE
IMM GRANULOCYTES # BLD AUTO: 0.04 K/UL (ref 0–0.5)
IMM GRANULOCYTES NFR BLD AUTO: 0.5 % (ref 0–5)
KETONES UR QL STRIP.AUTO: NEGATIVE MG/DL
LDLC SERPL CALC-MCNC: 118 MG/DL (ref 0–100)
LEUKOCYTE ESTERASE UR QL STRIP.AUTO: NEGATIVE
LYMPHOCYTES # BLD: 2.54 K/UL (ref 0.5–4.6)
LYMPHOCYTES NFR BLD: 30.8 % (ref 13–44)
MAGNESIUM SERPL-MCNC: 1.8 MG/DL (ref 1.8–2.4)
MCH RBC QN AUTO: 27.8 PG (ref 26.1–32.9)
MCHC RBC AUTO-ENTMCNC: 30.5 G/DL (ref 31.4–35)
MCV RBC AUTO: 91.1 FL (ref 82–102)
MONOCYTES # BLD: 0.78 K/UL (ref 0.1–1.3)
MONOCYTES NFR BLD: 9.5 % (ref 4–12)
NEUTS SEG # BLD: 4.55 K/UL (ref 1.7–8.2)
NEUTS SEG NFR BLD: 55.1 % (ref 43–78)
NITRITE UR QL STRIP.AUTO: NEGATIVE
NRBC # BLD: 0 K/UL (ref 0–0.2)
PH UR STRIP: 5.5 (ref 5–9)
PLATELET # BLD AUTO: 221 K/UL (ref 150–450)
PMV BLD AUTO: 10.9 FL (ref 9.4–12.3)
POTASSIUM SERPL-SCNC: 4.4 MMOL/L (ref 3.5–5.1)
PROT SERPL-MCNC: 7.2 G/DL (ref 6.3–8.2)
PROT UR STRIP-MCNC: NEGATIVE MG/DL
RBC # BLD AUTO: 3.92 M/UL (ref 4.05–5.2)
SODIUM SERPL-SCNC: 141 MMOL/L (ref 136–145)
SP GR UR REFRACTOMETRY: 1.01 (ref 1–1.02)
TRIGL SERPL-MCNC: 222 MG/DL (ref 0–150)
TSH W FREE THYROID IF ABNORMAL: 1.59 UIU/ML (ref 0.27–4.2)
UROBILINOGEN UR QL STRIP.AUTO: 0.2 EU/DL (ref 0.2–1)
VLDLC SERPL CALC-MCNC: 44 MG/DL (ref 6–23)
WBC # BLD AUTO: 8.3 K/UL (ref 4.3–11.1)

## 2025-05-09 PROCEDURE — 1123F ACP DISCUSS/DSCN MKR DOCD: CPT | Performed by: NURSE PRACTITIONER

## 2025-05-09 PROCEDURE — 99215 OFFICE O/P EST HI 40 MIN: CPT | Performed by: NURSE PRACTITIONER

## 2025-05-09 PROCEDURE — G2211 COMPLEX E/M VISIT ADD ON: HCPCS | Performed by: NURSE PRACTITIONER

## 2025-05-09 PROCEDURE — 3078F DIAST BP <80 MM HG: CPT | Performed by: NURSE PRACTITIONER

## 2025-05-09 PROCEDURE — 3075F SYST BP GE 130 - 139MM HG: CPT | Performed by: NURSE PRACTITIONER

## 2025-05-09 RX ORDER — DOXYCYCLINE 100 MG/1
100 CAPSULE ORAL 2 TIMES DAILY
COMMUNITY
Start: 2025-04-30

## 2025-05-09 SDOH — ECONOMIC STABILITY: FOOD INSECURITY: WITHIN THE PAST 12 MONTHS, THE FOOD YOU BOUGHT JUST DIDN'T LAST AND YOU DIDN'T HAVE MONEY TO GET MORE.: NEVER TRUE

## 2025-05-09 SDOH — ECONOMIC STABILITY: FOOD INSECURITY: WITHIN THE PAST 12 MONTHS, YOU WORRIED THAT YOUR FOOD WOULD RUN OUT BEFORE YOU GOT MONEY TO BUY MORE.: NEVER TRUE

## 2025-05-09 ASSESSMENT — PATIENT HEALTH QUESTIONNAIRE - PHQ9
1. LITTLE INTEREST OR PLEASURE IN DOING THINGS: NOT AT ALL
SUM OF ALL RESPONSES TO PHQ QUESTIONS 1-9: 0
2. FEELING DOWN, DEPRESSED OR HOPELESS: NOT AT ALL
SUM OF ALL RESPONSES TO PHQ QUESTIONS 1-9: 0

## 2025-05-09 NOTE — PROGRESS NOTES
[facility] Internal Medicine  [address] [id number]     Office Visit    Lubna Cabezas   1950   05/10/25       Subjective:     Chief Complaint   Patient presents with    New Patient     Pt is here today to establish care.     Back Pain     Pt reports left lower back pain that started yesterday. It hurts with twisting movements.     Headache     Pt reports daily issues with headaches x 6 months. Pt reports pain in the neck that radiates to the top of the head.         History of Present Illness  The patient is a 74-year-old female who presents for the establishment of primary care. She has a past medical history of paroxysmal atrial fibrillation, congestive heart failure (CHF), hypertension, asthma, gastroesophageal reflux disease (GERD), severe obesity, stress incontinence, and anemia. She was previously under the care of GEE Matos with [facility].    She has been experiencing headaches since the fall of 2024, described as a sensation of internal hair pulling, extending around her head and down her neck. The pain is more pronounced at the base of her skull when her neck hurts. She occasionally experiences neck pain during movement, which she suspects may be due to arthritis. She has not sought massage therapy or any other help for her symptoms. The duration of her headaches varies, sometimes lasting all day, other times only a short while. She experiences headaches daily, with some days being worse than others. She occasionally experiences numbness or tingling in her hands or arms if she falls asleep in her recliner especially. She also reports a sensation similar to vertigo. She has not identified any specific triggers for her headaches. She has attempted to manage the pain with ibuprofen, despite being advised against it due to her use of Eliquis. She has not undergone any imaging studies for her headaches. She has had a CT scan of her sinuses in the past, which revealed

## 2025-05-10 ASSESSMENT — ENCOUNTER SYMPTOMS
DIARRHEA: 0
BACK PAIN: 1
COUGH: 0
ABDOMINAL PAIN: 0
ABDOMINAL DISTENTION: 0
VOMITING: 0
NAUSEA: 0
CONSTIPATION: 0
CHEST TIGHTNESS: 0
SHORTNESS OF BREATH: 0

## 2025-05-11 ENCOUNTER — RESULTS FOLLOW-UP (OUTPATIENT)
Dept: INTERNAL MEDICINE CLINIC | Facility: CLINIC | Age: 75
End: 2025-05-11

## 2025-05-11 DIAGNOSIS — J01.40 SUBACUTE PANSINUSITIS: ICD-10-CM

## 2025-05-11 DIAGNOSIS — E55.9 VITAMIN D DEFICIENCY: Primary | ICD-10-CM

## 2025-05-11 LAB
BACTERIA SPEC CULT: NORMAL
SERVICE CMNT-IMP: NORMAL

## 2025-05-28 DIAGNOSIS — M50.20 PROTRUDED CERVICAL DISC: Primary | ICD-10-CM

## 2025-05-28 DIAGNOSIS — G44.86 CERVICOGENIC HEADACHE: ICD-10-CM

## 2025-05-28 DIAGNOSIS — R20.2 NUMBNESS AND TINGLING OF HAND: ICD-10-CM

## 2025-05-28 DIAGNOSIS — R20.0 NUMBNESS AND TINGLING OF HAND: ICD-10-CM

## 2025-05-28 DIAGNOSIS — M54.2 NECK PAIN, CHRONIC: ICD-10-CM

## 2025-05-28 DIAGNOSIS — G89.29 NECK PAIN, CHRONIC: ICD-10-CM

## 2025-05-28 DIAGNOSIS — M48.02 CERVICAL STENOSIS OF SPINAL CANAL: ICD-10-CM

## 2025-05-30 RX ORDER — LEVOFLOXACIN 750 MG/1
750 TABLET, FILM COATED ORAL DAILY
Qty: 5 TABLET | Refills: 0 | Status: SHIPPED | OUTPATIENT
Start: 2025-05-30 | End: 2025-06-04

## 2025-06-05 ENCOUNTER — OFFICE VISIT (OUTPATIENT)
Age: 75
End: 2025-06-05
Payer: COMMERCIAL

## 2025-06-05 VITALS — BODY MASS INDEX: 39.32 KG/M2 | HEIGHT: 65 IN | WEIGHT: 236 LBS

## 2025-06-05 DIAGNOSIS — G89.29 CHRONIC NECK PAIN: Primary | ICD-10-CM

## 2025-06-05 DIAGNOSIS — M18.0 ARTHRITIS OF CARPOMETACARPAL (CMC) JOINT OF BOTH THUMBS: ICD-10-CM

## 2025-06-05 DIAGNOSIS — M54.2 CHRONIC NECK PAIN: Primary | ICD-10-CM

## 2025-06-05 PROCEDURE — 1123F ACP DISCUSS/DSCN MKR DOCD: CPT | Performed by: ORTHOPAEDIC SURGERY

## 2025-06-05 PROCEDURE — 99204 OFFICE O/P NEW MOD 45 MIN: CPT | Performed by: ORTHOPAEDIC SURGERY

## 2025-06-05 RX ORDER — TRIAMCINOLONE ACETONIDE 1 MG/G
CREAM TOPICAL
COMMUNITY
Start: 2025-05-26

## 2025-06-05 NOTE — PROGRESS NOTES
med    Hypertension     controlled with med    Obesity     Rash     Urinary incontinence        Medications:     Current Outpatient Medications   Medication Sig    triamcinolone (KENALOG) 0.1 % cream Apply to legs twice daily    vitamin D (CHOLECALCIFEROL) 61026 UNIT CAPS Take 1 capsule by mouth once a week    doxycycline hyclate (VIBRAMYCIN) 100 MG capsule Take 1 capsule by mouth 2 times daily    apixaban (ELIQUIS) 5 MG TABS tablet Take 1 tablet by mouth 2 times daily    losartan (COZAAR) 50 MG tablet Take 1 tablet by mouth daily    spironolactone (ALDACTONE) 25 MG tablet Take 1 tablet by mouth daily    potassium chloride (KLOR-CON) 10 MEQ extended release tablet Take 1 tablet by mouth daily    metoprolol succinate (TOPROL XL) 100 MG extended release tablet Take 1 tablet by mouth daily    magnesium oxide (MAG-OX) 400 (240 Mg) MG tablet Take 1 tablet by mouth 2 times daily    BREO ELLIPTA 100-25 MCG/ACT inhaler Inhale 1 puff into the lungs daily (Patient not taking: Reported on 2/20/2025)    cetirizine (ZYRTEC) 10 MG tablet Take 1 tablet by mouth daily    montelukast (SINGULAIR) 10 MG tablet Take 1 tablet by mouth daily    albuterol sulfate HFA (PROVENTIL;VENTOLIN;PROAIR) 108 (90 Base) MCG/ACT inhaler Inhale 2 puffs every 6 (six) hours as needed for wheezing or shortness of breath.    furosemide (LASIX) 40 MG tablet Take one tablet daily as needed for fluid    azelastine (ASTELIN) 0.1 % nasal spray 1 spray by Nasal route 2 times daily Use in each nostril as directed    fluticasone (FLONASE) 50 MCG/ACT nasal spray 1 spray by Each Nostril route daily     No current facility-administered medications for this visit.       Allergies:     Allergies   Allergen Reactions    Amoxicillin Diarrhea        Physical Exam  Musculoskeletal:  Fingertips: Arthritis  Thumb (bilateral): Pain on loading, deformity, and tenderness at the CMC joint      Physical Exam      The patient is well-developed and well nourished. Mood and

## 2025-06-09 ENCOUNTER — TELEPHONE (OUTPATIENT)
Dept: ORTHOPEDIC SURGERY | Age: 75
End: 2025-06-09

## 2025-06-09 NOTE — TELEPHONE ENCOUNTER
Call Ramona crow/ [facility] at [phone] needs to clarify if patient needs a np eval apt or inject only

## 2025-06-10 ENCOUNTER — HOSPITAL ENCOUNTER (OUTPATIENT)
Dept: MAMMOGRAPHY | Age: 75
Discharge: HOME | End: 2025-06-13
Payer: COMMERCIAL

## 2025-06-10 DIAGNOSIS — Z78.0 POST-MENOPAUSAL: ICD-10-CM

## 2025-06-10 PROCEDURE — 77080 DXA BONE DENSITY AXIAL: CPT

## 2025-06-30 ENCOUNTER — OFFICE VISIT (OUTPATIENT)
Age: 75
End: 2025-06-30
Payer: COMMERCIAL

## 2025-06-30 VITALS — BODY MASS INDEX: 40.97 KG/M2 | HEIGHT: 64 IN | WEIGHT: 240 LBS

## 2025-06-30 DIAGNOSIS — M18.0 PRIMARY ARTHROSIS OF FIRST CARPOMETACARPAL JOINTS, BILATERAL: ICD-10-CM

## 2025-06-30 DIAGNOSIS — M79.641 BILATERAL HAND PAIN: Primary | ICD-10-CM

## 2025-06-30 DIAGNOSIS — M79.642 BILATERAL HAND PAIN: Primary | ICD-10-CM

## 2025-06-30 PROCEDURE — 1123F ACP DISCUSS/DSCN MKR DOCD: CPT | Performed by: ORTHOPAEDIC SURGERY

## 2025-06-30 PROCEDURE — 20600 DRAIN/INJ JOINT/BURSA W/O US: CPT | Performed by: ORTHOPAEDIC SURGERY

## 2025-06-30 PROCEDURE — 99203 OFFICE O/P NEW LOW 30 MIN: CPT | Performed by: ORTHOPAEDIC SURGERY

## 2025-06-30 RX ORDER — BETAMETHASONE SODIUM PHOSPHATE AND BETAMETHASONE ACETATE 3; 3 MG/ML; MG/ML
6 INJECTION, SUSPENSION INTRA-ARTICULAR; INTRALESIONAL; INTRAMUSCULAR; SOFT TISSUE ONCE
Status: COMPLETED | OUTPATIENT
Start: 2025-06-30 | End: 2025-06-30

## 2025-06-30 RX ADMIN — BETAMETHASONE SODIUM PHOSPHATE AND BETAMETHASONE ACETATE 6 MG: 3; 3 INJECTION, SUSPENSION INTRA-ARTICULAR; INTRALESIONAL; INTRAMUSCULAR; SOFT TISSUE at 11:15

## 2025-07-01 ENCOUNTER — TELEPHONE (OUTPATIENT)
Age: 75
End: 2025-07-01

## 2025-07-01 NOTE — TELEPHONE ENCOUNTER
If interruption of novel anticoagulation therapy is deemed necessary, the agent can be held for short period of time with a low risk of embolic events from atrial fibrillation.  Typically less than a 1% risk of CVA or embolic event.    As always,   A risk-benefit decision should be made by the physician performing procedure when deciding to hold anticoagulation.       Her cardiac condition is stable.  Would consider low risk for the procedure from a perspective of cardiac complication

## 2025-07-01 NOTE — TELEPHONE ENCOUNTER
Risk assessment and Eliquis hold 3 days prior to Cervical Medical Branch block     Last office visit 3-12-25  Afib ablation 10-0-24  Last echo 9-30-24

## 2025-07-01 NOTE — TELEPHONE ENCOUNTER
Cardiac Clearance        Physician or Practice Requesting:[facility]  : Tracy   Contact Phone Number: ?  Fax Number: [phone]  Date of Surgery/Procedure: TBD  Type of Surgery or Procedure: Cervical Medical Branch block   Type of Anesthesia:   Type of Clearance Requested: risk assessment and any medication hold   Medication to Hold:eliquis   Days to Hold: 3 day hold

## 2025-07-02 NOTE — PROGRESS NOTES
The patient was prescribed and fitted with a CMC controller plus brace for the right hand, size S/M.     Patient read and signed documenting they understand and agree to [facility]'s current DME return policy.   
injection and no injection therapy were discussed. Risks including skin blanching, subcutaneous fat atrophy, and elevations in blood glucose levels were discussed.  The patient consented for an injection. Time out performed. The patient has been identified by name and birthdate. The injection site was identified, marked and prepped with a alcohol swab. The right thumb CMC joint was injected with 0.5ml of 6mg/ml Celestone and 0.5ml of Lidocaine plain 1%. The injection site was then dressed with a bandaid. The patient tolerated the injection well. The patient was instructed to monitor their blood sugars if diabetic and call if any concerns. The patient was instructed to call the office if any adverse local effects occurred or any if any questions or concerns arise.  .     Patient voiced accordance and understanding of the information provided and the formulated plan. All questions were answered to the patient's satisfaction during the encounter.        Candace Ellsworth MD  Orthopaedic Surgery  06/30/25  12:50 PM

## 2025-07-31 DIAGNOSIS — J30.9 ALLERGIC RHINITIS, UNSPECIFIED SEASONALITY, UNSPECIFIED TRIGGER: ICD-10-CM

## 2025-07-31 DIAGNOSIS — J45.40 MODERATE PERSISTENT ASTHMA, UNSPECIFIED WHETHER COMPLICATED: ICD-10-CM

## 2025-08-08 RX ORDER — MONTELUKAST SODIUM 10 MG/1
10 TABLET ORAL DAILY
Qty: 90 TABLET | Refills: 0 | Status: SHIPPED | OUTPATIENT
Start: 2025-08-08

## 2025-08-19 ENCOUNTER — OFFICE VISIT (OUTPATIENT)
Dept: PULMONOLOGY | Age: 75
End: 2025-08-19
Payer: COMMERCIAL

## 2025-08-19 VITALS
DIASTOLIC BLOOD PRESSURE: 83 MMHG | OXYGEN SATURATION: 96 % | RESPIRATION RATE: 15 BRPM | HEART RATE: 68 BPM | WEIGHT: 236.2 LBS | HEIGHT: 63 IN | BODY MASS INDEX: 41.85 KG/M2 | TEMPERATURE: 97.2 F | SYSTOLIC BLOOD PRESSURE: 127 MMHG

## 2025-08-19 DIAGNOSIS — J45.40 MODERATE PERSISTENT ASTHMA, UNSPECIFIED WHETHER COMPLICATED: ICD-10-CM

## 2025-08-19 DIAGNOSIS — J45.20 MILD INTERMITTENT ASTHMA WITHOUT COMPLICATION: Primary | ICD-10-CM

## 2025-08-19 DIAGNOSIS — J30.9 ALLERGIC RHINITIS, UNSPECIFIED SEASONALITY, UNSPECIFIED TRIGGER: ICD-10-CM

## 2025-08-19 PROCEDURE — 1123F ACP DISCUSS/DSCN MKR DOCD: CPT | Performed by: INTERNAL MEDICINE

## 2025-08-19 PROCEDURE — 99214 OFFICE O/P EST MOD 30 MIN: CPT | Performed by: INTERNAL MEDICINE

## 2025-08-19 PROCEDURE — 3079F DIAST BP 80-89 MM HG: CPT | Performed by: INTERNAL MEDICINE

## 2025-08-19 PROCEDURE — 3074F SYST BP LT 130 MM HG: CPT | Performed by: INTERNAL MEDICINE

## 2025-08-19 RX ORDER — AZELASTINE 1 MG/ML
1 SPRAY, METERED NASAL 2 TIMES DAILY
Qty: 90 ML | Refills: 3 | Status: SHIPPED | OUTPATIENT
Start: 2025-08-19 | End: 2025-11-17

## 2025-08-19 RX ORDER — MONTELUKAST SODIUM 10 MG/1
10 TABLET ORAL DAILY
Qty: 90 TABLET | Refills: 0 | Status: SHIPPED | OUTPATIENT
Start: 2025-08-19

## 2025-08-19 RX ORDER — FLUTICASONE PROPIONATE 50 MCG
1 SPRAY, SUSPENSION (ML) NASAL DAILY
Qty: 48 G | Refills: 3 | Status: SHIPPED | OUTPATIENT
Start: 2025-08-19

## 2025-08-19 RX ORDER — CETIRIZINE HYDROCHLORIDE 10 MG/1
10 TABLET ORAL DAILY
Qty: 90 TABLET | Refills: 3 | Status: SHIPPED | OUTPATIENT
Start: 2025-08-19

## (undated) DEVICE — GARMENT,MEDLINE,DVT,INT,CALF,LG, GEN2: Brand: MEDLINE

## (undated) DEVICE — CYSTO/BLADDER IRRIGATION SET, REGULATING CLAMP

## (undated) DEVICE — PINNACLE TIF INTRODUCER SHEATH: Brand: PINNACLE

## (undated) DEVICE — LIQUIBAND RAPID ADHESIVE 36/CS 0.8ML: Brand: MEDLINE

## (undated) DEVICE — 18G NG KIT WITH 96IN PROBE COVER (10 PK): Brand: SITE-RITE

## (undated) DEVICE — ELECTRODE PT RET AD L9FT HI MOIST COND ADH HYDRGEL CORDED

## (undated) DEVICE — SOLUTION IRRIG 1000ML STRL H2O USP PLAS POUR BTL

## (undated) DEVICE — SUTURE PERMA-HAND SZ 0 L30IN NONABSORBABLE BLK L36MM CT-1 424H

## (undated) DEVICE — CATHETER MAP F CRV 2-2-2-2-2 MM SPC PERSEID OCTARAY

## (undated) DEVICE — GLIDESHEATH SLENDER STAINLESS STEEL KIT: Brand: GLIDESHEATH SLENDER

## (undated) DEVICE — PENCIL ES L3M BTTN SWCH HOLSTER W/ BLDE ELECTRD EDGE

## (undated) DEVICE — CATHETER US GE COMPATIBILITY SOUNDSTAR ECO 10FR

## (undated) DEVICE — PATCH REF EXT FOR CARTO 3 SYS (EA = 6 PACKS)

## (undated) DEVICE — TRAY PREP DRY W/ PREM GLV 2 APPL 6 SPNG 2 UNDPD 1 OVERWRAP

## (undated) DEVICE — CATHETER,FOLEY,SILI-ELAST,LTX,16FR,10ML: Brand: MEDLINE

## (undated) DEVICE — PINNACLE INTRODUCER SHEATH: Brand: PINNACLE

## (undated) DEVICE — TUBING PMP FOR CARTO SYS SMARTABLATE

## (undated) DEVICE — BAG,DRAINAGE,4L,A/R TOWER,LL,SLIDE TAP: Brand: MEDLINE

## (undated) DEVICE — CARDINAL HEALTH FLEXIBLE LIGHT HANDLE COVER: Brand: CARDINAL HEALTH

## (undated) DEVICE — RADIFOCUS OPTITORQUE ANGIOGRAPHIC CATHETER: Brand: OPTITORQUE

## (undated) DEVICE — CATHETER ABLAT 8FR L115CM 1-6-2MM SPC TIP 3.5MM FJ CRV

## (undated) DEVICE — SOLUTION IRRIG 3000ML H2O STRL BAG

## (undated) DEVICE — PACKING GZ W2INXL6FT WVN COT VAG RADPQ

## (undated) DEVICE — SYRINGE MED 30ML STD CLR PLAS LUERLOCK TIP N CTRL DISP

## (undated) DEVICE — STERILE (15.2 TAPERED TO 7.6 X 183CM) POLYETHYLENE ACCORDION-FOLDED COVER FOR USE WITH SIEMENS ACUNAV ULTRASOUND CATHETER FAMILY CONNECTOR: Brand: SWIFTLINK TRANSDUCER COVER

## (undated) DEVICE — MINOR LITHOTOMY PACK: Brand: MEDLINE INDUSTRIES, INC.

## (undated) DEVICE — BLADE CLIPPER GEN PURP NS

## (undated) DEVICE — CATHETER EP 7FR L115CM 2-8-2MM SPC TIP 2MM 10 ELECTRD FJ

## (undated) DEVICE — SUTURE CAPIO MONODEK SZ 0 L48IN ABSRB 1/2 CIR DBL ARMED 833137

## (undated) DEVICE — PACK SURGICAL PROCEDURE KIT CYSTOSCOPY TOTE

## (undated) DEVICE — CATHETER DIAG AD 5FR L110CM 145DEG COR GRY HYDRPHLC NYL

## (undated) DEVICE — DEVICE SECUREMENT AD W/ TRICOT ANCHR PD FOR F LTX SIL CATH

## (undated) DEVICE — Device: Brand: NRG TRANSSEPTAL NEEDLE

## (undated) DEVICE — DRAPE,TOP,102X53,STERILE: Brand: MEDLINE

## (undated) DEVICE — SHEET,DRAPE,53X77,STERILE: Brand: MEDLINE

## (undated) DEVICE — INTENDED FOR TISSUE SEPARATION, AND OTHER PROCEDURES THAT REQUIRE A SHARP SURGICAL BLADE TO PUNCTURE OR CUT.: Brand: BARD-PARKER ® STAINLESS STEEL BLADES

## (undated) DEVICE — STANDARD HYPODERMIC NEEDLE,POLYPROPYLENE HUB: Brand: MONOJECT

## (undated) DEVICE — GUIDEWIRE 035IN 210CM PTFE COAT FIX COR J TIP 15MM FIRM BODY

## (undated) DEVICE — SHEATH INTRO 50 DEG 0.038 INX180 CM 8.5 FRX63 CM HEARTSPAN

## (undated) DEVICE — GLOVE SURG SZ 8 CRM LTX FREE POLYISOPRENE POLYMER BEAD ANTI

## (undated) DEVICE — SYSTEM CLOSURE 6-12 FR VEN VASC VASCADE MVP

## (undated) DEVICE — DEVICE COMPR REG 24 CM VASC BND

## (undated) DEVICE — PRESSURE MONITORING SET: Brand: TRUWAVE

## (undated) DEVICE — SYRINGE,EAR/ULCER, 2 OZ, STERILE: Brand: MEDLINE

## (undated) DEVICE — CATHETER ANGIO 5FR L100CM GRY S STL NYL JR4 3 SEG BRAID L

## (undated) DEVICE — CATHETER EP 7FR L115CM 2-8-2MM SPC TIP 2MM 10 ELECTRD F L

## (undated) DEVICE — SHEET, T, LAPAROTOMY, STERILE: Brand: MEDLINE